# Patient Record
Sex: MALE | Race: WHITE | Employment: FULL TIME | URBAN - METROPOLITAN AREA
[De-identification: names, ages, dates, MRNs, and addresses within clinical notes are randomized per-mention and may not be internally consistent; named-entity substitution may affect disease eponyms.]

---

## 2017-01-23 ENCOUNTER — OFFICE VISIT (OUTPATIENT)
Dept: INTERNAL MEDICINE CLINIC | Age: 57
End: 2017-01-23

## 2017-01-23 VITALS
SYSTOLIC BLOOD PRESSURE: 103 MMHG | HEART RATE: 71 BPM | HEIGHT: 73 IN | BODY MASS INDEX: 31.86 KG/M2 | OXYGEN SATURATION: 96 % | DIASTOLIC BLOOD PRESSURE: 68 MMHG | TEMPERATURE: 97.4 F | WEIGHT: 240.4 LBS | RESPIRATION RATE: 18 BRPM

## 2017-01-23 DIAGNOSIS — B20 HUMAN IMMUNODEFICIENCY VIRUS (HIV) DISEASE (HCC): Primary | ICD-10-CM

## 2017-01-23 RX ORDER — METHYLPHENIDATE HYDROCHLORIDE 54 MG/1
54 TABLET ORAL DAILY
Qty: 30 TAB | Refills: 0 | Status: SHIPPED | OUTPATIENT
Start: 2017-01-23 | End: 2017-03-07 | Stop reason: SDUPTHER

## 2017-01-23 NOTE — PROGRESS NOTES
LLOYD Walters is a 64 y.o., male and is here today for follow up of HIV infection. The patient has been 100% compliant with HAART. Last CD4 count and Viral Load were 547 (23.8%) and less than 20 cps/ml on 9/26/16. Current HAART is comprised of daily boosted darunavir, rilpivirine, and dolutetgravir, which he is tolerating very well. His PCP is Dr. Desiree Walter who treats him for dyslipidemia, GERD, HTN, Vit D deficiency, DJD,and ADD. Allergy history, medication list, past medical history, and social history were all reviewed. No changes were made in any of these. Up to date on all immunizations. ROS Has lost about 6 lbs with diet. Feels generally very well. No fever, sweats, chills, nausea, vomiting, diarrhea, or rash. Weight has been stable abdomen obese status. . Level of energy is good. No changes in vision. No headaches or cognitive impairment. No medications have been added to the regimen since last visit. No signs of peripheral neuropathy. Review of systems otherwise negative with greater than 10 systems reviewed    Physical Exam  Vital signs and weight are stable. EOMI. Sclera anicteric. No thrush or leukoplakia. Lungs clear to A&P. Regular rhythm without murmur. Abdomen without organomegaly, mass, or tenderness. Bowel sounds normal. No joint abnormalities or edema. No lymphadenopathy. Neurologic exam is nonfocal.         ASSESSMENT and PLAN  #1: HIV infection. HIV is clinically and virologically stable. No change in HAART is planned at this time. CD4 count and viral load will be ordered today. Return in 4 months for follow up. The patient will check My Chart for lab results and call if there are any questions. #2: Obesity. Congratulated on weight loss and encouraged to continue diet. #3: Dyslipidemia. #4: Hypogonadism. #5. ADD. #6. Hypertension.  Well controlled

## 2017-01-23 NOTE — MR AVS SNAPSHOT
Visit Information Date & Time Provider Department Dept. Phone Encounter #  
 1/23/2017 11:30 AM Michael Glez, [address] [phone] [id number] Follow-up Instructions Return in about 4 months (around 5/23/2017), or if symptoms worsen or fail to improve. Your Appointments 1/23/2017 11:30 AM  
ROUTINE CARE with Michael Glez MD  
[facility] [address]) Appt Note: 4 month follow up  
 [address] [address]  
[phone]  
  
   
 [address]  [address]  
  
    
 2/20/2017  9:30 AM  
ROUTINE CARE with Iván Sheehan, [address] [place] [address] Road) Appt Note: 6 mon f/u  
 [address] [id number]  
[address]  [address] Upcoming Health Maintenance Date Due Pneumococcal 19-64 Highest Risk (2 of 3 - PPSV23) 3/26/2015 COLONOSCOPY 9/24/2021 DTaP/Tdap/Td series (2 - Td) 8/18/2026 Allergies as of 1/23/2017  Review Complete On: 1/23/2017 By: Michael Glez MD  
  
 Severity Noted Reaction Type Reactions Sustiva [Efavirenz]  11/15/2011    Vertigo  
 nightmares Current Immunizations  Reviewed on 1/23/2017 Name Date Influenza Vaccine 9/26/2016, 9/16/2014, 9/16/2013 Influenza Vaccine (Quad) PF 10/22/2015 Influenza Vaccine PF 9/17/2013 Pneumococcal Conjugate (PCV-13) 1/29/2015 Pneumococcal Conjugate (PCV-7) 6/17/2013 Tdap 8/18/2016 10:35 AM  
  
 Reviewed by Michael Glez MD on 1/23/2017 at 11:19 AM  
You Were Diagnosed With   
  
 Codes Comments Human immunodeficiency virus (HIV) disease ([facility]    -  Primary ICD-10-CM: B20 
ICD-9-CM: 934 Vitals BP Pulse Temp Resp Height(growth percentile) Weight(growth percentile)  103/68 (BP 1 Location: Left arm, BP Patient Position: Sitting) 71 97.4 °F (36.3 °C) (Oral) 18 6' 1\" (1.854 m) 240 lb 6.4 oz (109 kg) SpO2 BMI Smoking Status 96% 31.72 kg/m2 Former Smoker Vitals History BMI and BSA Data Body Mass Index Body Surface Area 31.72 kg/m 2 2.37 m 2 [facility] Phone [facility] [address] [phone] Your Updated Medication List  
  
   
This list is accurate as of: 1/23/17 11:23 AM.  Always use your most recent med list.  
  
  
  
  
 atorvastatin 20 mg tablet Commonly known as:  LIPITOR Take 1 Tab by mouth daily. diclofenac EC 50 mg EC tablet Commonly known as:  VOLTAREN  
TAKE ONE TABLET BY MOUTH TWICE A DAY  
  
 EDURANT 25 mg Tab tablet Generic drug:  rilpivirine TAKE ONE TABLET BY MOUTH DAILY  
  
 fenofibrate nanocrystallized 145 mg tablet Commonly known as:  TRICOR  
TAKE ONE TABLET BY MOUTH DAILY * hydroCHLOROthiazide 25 mg tablet Commonly known as:  HYDRODIURIL Take 1 Tab by mouth daily. * hydroCHLOROthiazide 25 mg tablet Commonly known as:  HYDRODIURIL  
TAKE ONE TABLET BY MOUTH DAILY  
  
 lisinopril 40 mg tablet Commonly known as:  PRINIVIL, ZESTRIL  
TAKE ONE TABLET BY MOUTH DAILY  
  
 methylphenidate 54 mg CR tablet Commonly known as:  CONCERTA Take 1 Tab (54 mg total) by mouth daily. May fill 1-23-17  
  
 metoprolol succinate 100 mg tablet Commonly known as:  TOPROL-XL  
TAKE ONE TABLET BY MOUTH DAILY MULTIVITAMIN PO Take  by mouth. * PREZISTA 600 mg tablet Generic drug:  darunavir TAKE ONE TABLET BY MOUTH TWICE A DAY  
  
 * PREZISTA 800 mg tablet Generic drug:  darunavir TAKE ONE TABLET BY MOUTH DAILY. TAKE WITH NORVIR  
  
 raNITIdine 300 mg tablet Commonly known as:  ZANTAC Take 1 Tab by mouth daily. ritonavir 100 mg capsule Commonly known as:  Mary Lasso Take 1 Cap by mouth two (2) times a day. sildenafil citrate 100 mg tablet Commonly known as:  VIAGRA Take 1 Tab by mouth as needed. testosterone 1.62 % (20.25 mg/1.25gram) gel Commonly known as:  ANDROGEL Apply 40.5 mg to affected area daily. Max Daily Amount: 40.5 mg.  
  
 TIVICAY 50 mg Tab tablet Generic drug:  dolutegravir TAKE ONE TABLET BY MOUTH DAILY  
  
 VITAMIN D3 1,000 unit Cap Generic drug:  cholecalciferol Take  by mouth daily. * Notice: This list has 4 medication(s) that are the same as other medications prescribed for you. Read the directions carefully, and ask your doctor or other care provider to review them with you. We Performed the Following HIV-1 RNA QT BY PCR [95639 CPT(R)] LYMPHOCYTES, CD4 PERCENT AND ABSOLUTE [77066 CPT(R)] METABOLIC PANEL, COMPREHENSIVE [15854 CPT(R)] Follow-up Instructions Return in about 4 months (around 5/23/2017), or if symptoms worsen or fail to improve. Introducing hospitals & HEALTH SERVICES! Dear Ramiro Sy: Thank you for requesting a [employer] account. Our records indicate that you already have an active Spyra account. You can access your account anytime at [email or web address] Did you know that you can access your hospital and ER discharge instructions at any time in Spyra? You can also review all of your test results from your hospital stay or ER visit. Additional Information If you have questions, please visit the Frequently Asked Questions section of the Spyra website at [email or web address] Remember, Spyra is NOT to be used for urgent needs. For medical emergencies, dial 911. Now available from your iPhone and Android! Please provide this summary of care documentation to your next provider. Your primary care clinician is listed as Stefan ARGUETA. If you have any questions after today's visit, please call [phone].

## 2017-01-25 LAB
ALBUMIN SERPL-MCNC: 4.5 G/DL (ref 3.5–5.5)
ALBUMIN/GLOB SERPL: 1.6 {RATIO} (ref 1.1–2.5)
ALP SERPL-CCNC: 37 IU/L (ref 39–117)
ALT SERPL-CCNC: 36 IU/L (ref 0–44)
AST SERPL-CCNC: 30 IU/L (ref 0–40)
BASOPHILS # BLD AUTO: 0 X10E3/UL (ref 0–0.2)
BASOPHILS NFR BLD AUTO: 0 %
BILIRUB SERPL-MCNC: 0.4 MG/DL (ref 0–1.2)
BUN SERPL-MCNC: 25 MG/DL (ref 6–24)
BUN/CREAT SERPL: 21 (ref 9–20)
CALCIUM SERPL-MCNC: 9.7 MG/DL (ref 8.7–10.2)
CD3+CD4+ CELLS # BLD: 730 /UL (ref 359–1519)
CD3+CD4+ CELLS NFR BLD: 22.8 % (ref 30.8–58.5)
CHLORIDE SERPL-SCNC: 100 MMOL/L (ref 96–106)
CO2 SERPL-SCNC: 25 MMOL/L (ref 18–29)
CREAT SERPL-MCNC: 1.21 MG/DL (ref 0.76–1.27)
EOSINOPHIL # BLD AUTO: 0.3 X10E3/UL (ref 0–0.4)
EOSINOPHIL NFR BLD AUTO: 3 %
ERYTHROCYTE [DISTWIDTH] IN BLOOD BY AUTOMATED COUNT: 13.7 % (ref 12.3–15.4)
GLOBULIN SER CALC-MCNC: 2.9 G/DL (ref 1.5–4.5)
GLUCOSE SERPL-MCNC: 93 MG/DL (ref 65–99)
HCT VFR BLD AUTO: 37.8 % (ref 37.5–51)
HGB BLD-MCNC: 12.9 G/DL (ref 12.6–17.7)
HIV1 RNA # SERPL NAA+PROBE: <20 COPIES/ML
HIV1 RNA SERPL NAA+PROBE-LOG#: NORMAL LOG10COPY/ML
IMM GRANULOCYTES # BLD: 0 X10E3/UL (ref 0–0.1)
IMM GRANULOCYTES NFR BLD: 0 %
LYMPHOCYTES # BLD AUTO: 3.2 X10E3/UL (ref 0.7–3.1)
LYMPHOCYTES NFR BLD AUTO: 35 %
MCH RBC QN AUTO: 29.9 PG (ref 26.6–33)
MCHC RBC AUTO-ENTMCNC: 34.1 G/DL (ref 31.5–35.7)
MCV RBC AUTO: 88 FL (ref 79–97)
MONOCYTES # BLD AUTO: 1 X10E3/UL (ref 0.1–0.9)
MONOCYTES NFR BLD AUTO: 11 %
NEUTROPHILS # BLD AUTO: 4.5 X10E3/UL (ref 1.4–7)
NEUTROPHILS NFR BLD AUTO: 51 %
PLATELET # BLD AUTO: 274 X10E3/UL (ref 150–379)
POTASSIUM SERPL-SCNC: 4.7 MMOL/L (ref 3.5–5.2)
PROT SERPL-MCNC: 7.4 G/DL (ref 6–8.5)
RBC # BLD AUTO: 4.31 X10E6/UL (ref 4.14–5.8)
SODIUM SERPL-SCNC: 140 MMOL/L (ref 134–144)
WBC # BLD AUTO: 9 X10E3/UL (ref 3.4–10.8)

## 2017-03-07 ENCOUNTER — OFFICE VISIT (OUTPATIENT)
Dept: INTERNAL MEDICINE CLINIC | Age: 57
End: 2017-03-07

## 2017-03-07 VITALS
SYSTOLIC BLOOD PRESSURE: 114 MMHG | HEIGHT: 73 IN | WEIGHT: 239 LBS | OXYGEN SATURATION: 96 % | DIASTOLIC BLOOD PRESSURE: 70 MMHG | HEART RATE: 72 BPM | TEMPERATURE: 97.8 F | BODY MASS INDEX: 31.68 KG/M2

## 2017-03-07 DIAGNOSIS — I10 ESSENTIAL HYPERTENSION: Primary | ICD-10-CM

## 2017-03-07 DIAGNOSIS — E78.5 DYSLIPIDEMIA: ICD-10-CM

## 2017-03-07 DIAGNOSIS — F98.8 ADD (ATTENTION DEFICIT DISORDER): ICD-10-CM

## 2017-03-07 DIAGNOSIS — R79.89 LOW TESTOSTERONE LEVEL IN MALE: ICD-10-CM

## 2017-03-07 RX ORDER — METHYLPHENIDATE HYDROCHLORIDE 54 MG/1
54 TABLET ORAL DAILY
Qty: 30 TAB | Refills: 0 | Status: SHIPPED | OUTPATIENT
Start: 2017-03-07 | End: 2017-05-22 | Stop reason: SDUPTHER

## 2017-03-07 RX ORDER — DICLOFENAC SODIUM 50 MG/1
TABLET, DELAYED RELEASE ORAL
Qty: 30 TAB | Refills: 5 | Status: SHIPPED | OUTPATIENT
Start: 2017-03-07 | End: 2017-11-01 | Stop reason: ALTCHOICE

## 2017-03-07 NOTE — MR AVS SNAPSHOT
Visit Information Date & Time Provider Department Dept. Phone Encounter #  
 3/7/2017  1:30 PM Kinjal Pineda, [address] [place] [address],[address] [phone] [id number] Follow-up Instructions Return in about 6 months (around 9/7/2017) for htn hld, psa ADD. Upcoming Health Maintenance Date Due Pneumococcal 19-64 Highest Risk (2 of 3 - PPSV23) 3/26/2015 COLONOSCOPY 9/24/2021 DTaP/Tdap/Td series (2 - Td) 8/18/2026 Allergies as of 3/7/2017  Review Complete On: 3/7/2017 By: Lucas Ann LPN Severity Noted Reaction Type Reactions Sustiva [Efavirenz]  11/15/2011    Vertigo  
 nightmares Current Immunizations  Reviewed on 1/23/2017 Name Date Influenza Vaccine 9/26/2016, 9/16/2014, 9/16/2013 Influenza Vaccine (Quad) PF 10/22/2015 Influenza Vaccine PF 9/17/2013 Pneumococcal Conjugate (PCV-13) 1/29/2015 Pneumococcal Conjugate (PCV-7) 6/17/2013 Tdap 8/18/2016 10:35 AM  
  
 Not reviewed this visit You Were Diagnosed With   
  
 Codes Comments Essential hypertension    -  Primary ICD-10-CM: I10 
ICD-9-CM: 401.9 ADD (attention deficit disorder)     ICD-10-CM: F98.8 ICD-9-CM: 314.00 Dyslipidemia     ICD-10-CM: E78.5 ICD-9-CM: 272.4 Low testosterone level in male     ICD-10-CM: E29.1 ICD-9-CM: 257.2 Vitals BP Pulse Temp Height(growth percentile) Weight(growth percentile) SpO2  
 114/70 (BP 1 Location: Left arm, BP Patient Position: Sitting) 72 97.8 °F (36.6 °C) (Oral) 6' 1\" (1.854 m) 239 lb (108.4 kg) 96% BMI Smoking Status 31.53 kg/m2 Former Smoker BMI and BSA Data Body Mass Index Body Surface Area  
 31.53 kg/m 2 2.36 m 2 Preferred Pharmacy Pharmacy Name Phone Carlitos Mejias [address] [facility] [phone] Your Updated Medication List  
  
   
This list is accurate as of: 3/7/17  2:00 PM.  Always use your most recent med list.  
  
  
  
  
 atorvastatin 20 mg tablet Commonly known as:  LIPITOR Take 1 Tab by mouth daily. diclofenac EC 50 mg EC tablet Commonly known as:  VOLTAREN One bid prn pain EDURANT 25 mg Tab tablet Generic drug:  rilpivirine TAKE ONE TABLET BY MOUTH DAILY  
  
 fenofibrate nanocrystallized 145 mg tablet Commonly known as:  TRICOR  
TAKE ONE TABLET BY MOUTH DAILY  
  
 hydroCHLOROthiazide 25 mg tablet Commonly known as:  HYDRODIURIL Take 1 Tab by mouth daily. lisinopril 40 mg tablet Commonly known as:  PRINIVIL, ZESTRIL  
TAKE ONE TABLET BY MOUTH DAILY  
  
 methylphenidate 54 mg CR tablet Commonly known as:  CONCERTA Take 1 Tab (54 mg total) by mouth daily. May fill 3-23-17  
  
 metoprolol succinate 100 mg tablet Commonly known as:  TOPROL-XL  
TAKE ONE TABLET BY MOUTH DAILY MULTIVITAMIN PO Take  by mouth. PREZISTA 600 mg tablet Generic drug:  darunavir TAKE ONE TABLET BY MOUTH TWICE A DAY  
  
 raNITIdine 300 mg tablet Commonly known as:  ZANTAC Take 1 Tab by mouth daily. ritonavir 100 mg capsule Commonly known as:  Avelina Poll Take 1 Cap by mouth two (2) times a day. sildenafil citrate 100 mg tablet Commonly known as:  VIAGRA Take 1 Tab by mouth as needed. testosterone 20.25 mg/1.25 gram (1.62 %) gel Commonly known as:  ANDROGEL Apply 40.5 mg to affected area daily. Max Daily Amount: 40.5 mg.  
  
 TIVICAY 50 mg Tab tablet Generic drug:  dolutegravir TAKE ONE TABLET BY MOUTH DAILY  
  
 VITAMIN D3 1,000 unit Cap Generic drug:  cholecalciferol Take  by mouth daily. Prescriptions Printed Refills  
 methylphenidate ER 54 mg 24 hr tab 0 Sig: Take 1 Tab (54 mg total) by mouth daily. May fill 3-23-17 Class: Print Route: Oral  
  
Prescriptions Sent to Pharmacy Refills  
 diclofenac EC (VOLTAREN) 50 mg EC tablet 5  Sig: One bid prn pain  
 Class: Normal  
 [facility] [address] [facility] [phone] We Performed the Following TESTOSTERONE, TOTAL, ADULT MALE [31613 CPT(R)] Follow-up Instructions Return in about 6 months (around 9/7/2017) for htn hld, psa ADD. Introducing [facility] & HEALTH SERVICES! Dear Liu Riley: Thank you for requesting a [employer] account. Our records indicate that you already have an active [employer] account. You can access your account anytime at [email or web address] Did you know that you can access your hospital and ER discharge instructions at any time in [employer]? You can also review all of your test results from your hospital stay or ER visit. Additional Information If you have questions, please visit the Frequently Asked Questions section of the [employer] website at [email or web address] Remember, [employer] is NOT to be used for urgent needs. For medical emergencies, dial 911. Now available from your iPhone and Android! Please provide this summary of care documentation to your next provider. Your primary care clinician is listed as Gemma ARGUETA. If you have any questions after today's visit, please call [phone].

## 2017-03-07 NOTE — PROGRESS NOTES
HISTORY OF PRESENT ILLNESS  Heidi Avery is a 64 y.o. male. HPI     F/u HTN dyslipidemia, ADD GERD, low testosterone  Training for a triathalon  Having increased sciatic on right side, keeps him up at night x 2 weeks. Diclofenac helps and wants a refill  Overall the sciatica sxs are better since training,swimming  Pt hopes to get off high blood pressure medicines at some point    Patient Active Problem List    Diagnosis Date Noted    LFT elevation 01/30/2015    HTN (hypertension) 09/17/2013    Low serum testosterone level 09/17/2013    Dyslipidemia 09/17/2013    Human immunodeficiency virus (HIV) disease ([facility] 01/25/2013    ADD (attention deficit disorder) 01/25/2013     Current Outpatient Prescriptions   Medication Sig Dispense Refill    diclofenac EC (VOLTAREN) 50 mg EC tablet One bid prn pain 30 Tab 5    methylphenidate ER 54 mg 24 hr tab Take 1 Tab (54 mg total) by mouth daily. May fill 3-23-17 30 Tab 0    metoprolol succinate (TOPROL-XL) 100 mg tablet TAKE ONE TABLET BY MOUTH DAILY 30 Tab 9    atorvastatin (LIPITOR) 20 mg tablet Take 1 Tab by mouth daily. 30 Tab 11    cholecalciferol (VITAMIN D3) 1,000 unit cap Take  by mouth daily.  testosterone (ANDROGEL) 20.25 mg/1.25 gram (1.62 %) gel Apply 40.5 mg to affected area daily. Max Daily Amount: 40.5 mg. 1 Bottle 5    lisinopril (PRINIVIL, ZESTRIL) 40 mg tablet TAKE ONE TABLET BY MOUTH DAILY 30 Tab 11    TIVICAY 50 mg tab tablet TAKE ONE TABLET BY MOUTH DAILY 90 Tab 4    EDURANT 25 mg tab tablet TAKE ONE TABLET BY MOUTH DAILY 90 Tab 4    fenofibrate nanocrystallized (TRICOR) 145 mg tablet TAKE ONE TABLET BY MOUTH DAILY 30 Tab 11    hydrochlorothiazide (HYDRODIURIL) 25 mg tablet Take 1 Tab by mouth daily. 30 Tab 6    PREZISTA 600 mg tablet TAKE ONE TABLET BY MOUTH TWICE A  Tab 3    ritonavir (NORVIR) 100 mg capsule Take 1 Cap by mouth two (2) times a day. 180 Cap 5    MULTIVITAMIN PO Take  by mouth.         ranitidine (ZANTAC) 300 mg tablet Take 1 Tab by mouth daily. 30 Tab 11    sildenafil citrate (VIAGRA) 100 mg tablet Take 1 Tab by mouth as needed. 3 Tab 11     Allergies   Allergen Reactions    Sustiva [Efavirenz] Vertigo     nightmares      Lab Results  Component Value Date/Time   Glucose 93 01/23/2017 11:28 AM   LDL, calculated 131 08/18/2016 10:50 AM   Creatinine 1.21 01/23/2017 11:28 AM      Lab Results  Component Value Date/Time   Cholesterol, total 233 08/18/2016 10:50 AM   HDL Cholesterol 34 08/18/2016 10:50 AM   LDL, calculated 131 08/18/2016 10:50 AM   Triglyceride 338 08/18/2016 10:50 AM   CHOL/HDL Ratio 6.6 01/06/2010 11:47 AM       Lab Results  Component Value Date/Time   GFR est AA 77 01/23/2017 11:28 AM   GFR est non-AA 67 01/23/2017 11:28 AM   Creatinine 1.21 01/23/2017 11:28 AM   BUN 25 01/23/2017 11:28 AM   Sodium 140 01/23/2017 11:28 AM   Potassium 4.7 01/23/2017 11:28 AM   Chloride 100 01/23/2017 11:28 AM   CO2 25 01/23/2017 11:28 AM         ROS    Physical Exam   Constitutional: He appears well-developed and well-nourished. No distress. Appears stated age   HENT:   Head: Normocephalic. Cardiovascular: Normal rate, regular rhythm and normal heart sounds. Pulmonary/Chest: Effort normal and breath sounds normal.   Abdominal: Soft. Musculoskeletal: He exhibits no edema. Neurological: He is alert. Psychiatric: He has a normal mood and affect. Nursing note and vitals reviewed.       ASSESSMENT and PLAN  Ramiro Sy was seen today for physical.    Diagnoses and all orders for this visit:    Essential hypertension   Good control on medicines    ADD (attention deficit disorder)   refill ritalin 1/2 tab every day    reviewed    Dyslipidemia   Continue diet and exercise program    Low testosterone level in male  -     TESTOSTERONE, TOTAL, ADULT MALE   On 2 pumps per day -androgel   Has not noticed much improvement in energy but levels have been low    Sciatica   Refilled voltaren  Other orders  -     diclofenac EC (VOLTAREN) 50 mg EC tablet; One bid prn pain  -     methylphenidate ER 54 mg 24 hr tab; Take 1 Tab (54 mg total) by mouth daily.   May fill 3-23-17      Follow-up Disposition: Not on File

## 2017-05-10 ENCOUNTER — TELEPHONE (OUTPATIENT)
Dept: INTERNAL MEDICINE CLINIC | Age: 57
End: 2017-05-10

## 2017-05-10 NOTE — TELEPHONE ENCOUNTER
Dr. Rashmi Allen patient states he needs a call back to be advised what to do about Extreme Diarrhea that patient states has become so bad  He has soiled himself & thinks this could be a stomach virus & needs to be advised what to do so as not to become dehydrated. Please call to advise.  Thank you

## 2017-05-10 NOTE — TELEPHONE ENCOUNTER
Call completed to patient, two identifiers verified. Patient reports 3 loose bowel movements that were \"so severe, I spoiled my pants\" Patient advised to drink plenty of fluids and follow the brat diet. Patient reports taking an over the counter anti-diarrheal medication  with no relief. Please advise.

## 2017-05-11 NOTE — TELEPHONE ENCOUNTER
Call completed to patient, two identifiers verified. Patient advised per Dr. Curtis Shannon to Advise plenty of fluids, immodium ad prn- notify if fever, blood or ongoing diarrhea for > 2 days. Patient verbalized an understanding.

## 2017-05-15 ENCOUNTER — OFFICE VISIT (OUTPATIENT)
Dept: INTERNAL MEDICINE CLINIC | Age: 57
End: 2017-05-15

## 2017-05-15 VITALS
OXYGEN SATURATION: 99 % | SYSTOLIC BLOOD PRESSURE: 109 MMHG | BODY MASS INDEX: 30.09 KG/M2 | HEART RATE: 85 BPM | TEMPERATURE: 98 F | RESPIRATION RATE: 18 BRPM | HEIGHT: 73 IN | DIASTOLIC BLOOD PRESSURE: 70 MMHG | WEIGHT: 227 LBS

## 2017-05-15 DIAGNOSIS — R19.7 DIARRHEA OF PRESUMED INFECTIOUS ORIGIN: Primary | ICD-10-CM

## 2017-05-15 RX ORDER — DARUNAVIR 800 MG/1
TABLET, FILM COATED ORAL
COMMUNITY
Start: 2017-02-15 | End: 2017-08-14 | Stop reason: SDUPTHER

## 2017-05-15 NOTE — PATIENT INSTRUCTIONS
Diarrhea: Care Instructions  Your Care Instructions    Diarrhea is loose, watery stools (bowel movements). The exact cause is often hard to find. Sometimes diarrhea is your body's way of getting rid of what caused an upset stomach. Viruses, food poisoning, and many medicines can cause diarrhea. Some people get diarrhea in response to emotional stress, anxiety, or certain foods. Almost everyone has diarrhea now and then. It usually isn't serious, and your stools will return to normal soon. The important thing to do is replace the fluids you have lost, so you can prevent dehydration. The doctor has checked you carefully, but problems can develop later. If you notice any problems or new symptoms, get medical treatment right away. Follow-up care is a key part of your treatment and safety. Be sure to make and go to all appointments, and call your doctor if you are having problems. It's also a good idea to know your test results and keep a list of the medicines you take. How can you care for yourself at home? · Watch for signs of dehydration, which means your body has lost too much water. Dehydration is a serious condition and should be treated right away. Signs of dehydration are:  ¨ Increasing thirst and dry eyes and mouth. ¨ Feeling faint or lightheaded. ¨ Darker urine, and a smaller amount of urine than normal.  · To prevent dehydration, drink plenty of fluids, enough so that your urine is light yellow or clear like water. Choose water and other caffeine-free clear liquids until you feel better. If you have kidney, heart, or liver disease and have to limit fluids, talk with your doctor before you increase the amount of fluids you drink. · Begin eating small amounts of mild foods the next day, if you feel like it. ¨ Try yogurt that has live cultures of Lactobacillus. (Check the label.)  ¨ Avoid spicy foods, fruits, alcohol, and caffeine until 48 hours after all symptoms are gone.   ¨ Avoid chewing gum that contains sorbitol. ¨ Avoid dairy products (except for yogurt with Lactobacillus) while you have diarrhea and for 3 days after symptoms are gone. · The doctor may recommend that you take over-the-counter medicine, such as loperamide (Imodium), if you still have diarrhea after 6 hours. Read and follow all instructions on the label. Do not use this medicine if you have bloody diarrhea, a high fever, or other signs of serious illness. Call your doctor if you think you are having a problem with your medicine. When should you call for help? Call 911 anytime you think you may need emergency care. For example, call if:  · You passed out (lost consciousness). · Your stools are maroon or very bloody. Call your doctor now or seek immediate medical care if:  · You are dizzy or lightheaded, or you feel like you may faint. · Your stools are black and look like tar, or they have streaks of blood. · You have new or worse belly pain. · You have symptoms of dehydration, such as:  ¨ Dry eyes and a dry mouth. ¨ Passing only a little dark urine. ¨ Feeling thirstier than usual.  · You have a new or higher fever. Watch closely for changes in your health, and be sure to contact your doctor if:  · Your diarrhea is getting worse. · You see pus in the diarrhea. · You are not getting better after 2 days (48 hours). Where can you learn more? Go to http://carlos-regla.info/. Enter N862 in the search box to learn more about \"Diarrhea: Care Instructions. \"  Current as of: May 27, 2016  Content Version: 11.2  © 7109-3926 [employer]. Care instructions adapted under license by [employer] (which disclaims liability or warranty for this information).  If you have questions about a medical condition or this instruction, always ask your healthcare professional. [place] [address] any warranty or liability for your use of this information.  ----------------------------------  Bring in stool sample as soon as possible  Apply Desitin to protect around areas of bowel movements to prevent rash   Blood work today

## 2017-05-15 NOTE — PROGRESS NOTES
Chief Complaint   Patient presents with    Diarrhea     x1 week     1. Have you been to the ER, urgent care clinic since your last visit? Hospitalized since your last visit? No    2. Have you seen or consulted any other health care providers outside of the [address] [place] [address] since your last visit? Include any pap smears or colon screening.  No

## 2017-05-15 NOTE — PROGRESS NOTES
CC: Diarrhea (x1 week)      HPI:    He is a 64 y.o. male with a hx of HIV on antiviral medsw presents for evaluation of diarrhea    Symptoms onset 6 days ago. Stools are watery light brown, Patient called during weekend and started on ALICIA diet and took imodium with some relief   Lost 10 lbs. Had chills  Visited mother in law in the hospital 9 days ago and she had \"Diarrhea\" patient unsure of diagnosis. No recent antibiotics  No eating out  No recent med changes  Has had fissures in the past and has noted mild blood when wiping no blood in the stool. Had a colonoscopy at ~50 and diverticulosis per notes - I could not locate report   Has 2 dogs    ROS:  Constitutional: negative for fevers, chills, anorexia and weight loss  12 systems reviewed and negative other than  HPI         Past Medical History:   Diagnosis Date    ADD (attention deficit disorder)     HIV positive ([facility] [address] 1985       Current Outpatient Prescriptions on File Prior to Visit   Medication Sig Dispense Refill    diclofenac EC (VOLTAREN) 50 mg EC tablet One bid prn pain 30 Tab 5    methylphenidate ER 54 mg 24 hr tab Take 1 Tab (54 mg total) by mouth daily. May fill 3-23-17 30 Tab 0    metoprolol succinate (TOPROL-XL) 100 mg tablet TAKE ONE TABLET BY MOUTH DAILY 30 Tab 9    atorvastatin (LIPITOR) 20 mg tablet Take 1 Tab by mouth daily. 30 Tab 11    cholecalciferol (VITAMIN D3) 1,000 unit cap Take  by mouth daily.  ranitidine (ZANTAC) 300 mg tablet Take 1 Tab by mouth daily. 30 Tab 11    testosterone (ANDROGEL) 20.25 mg/1.25 gram (1.62 %) gel Apply 40.5 mg to affected area daily.  Max Daily Amount: 40.5 mg. 1 Bottle 5    lisinopril (PRINIVIL, ZESTRIL) 40 mg tablet TAKE ONE TABLET BY MOUTH DAILY 30 Tab 11    TIVICAY 50 mg tab tablet TAKE ONE TABLET BY MOUTH DAILY 90 Tab 4    EDURANT 25 mg tab tablet TAKE ONE TABLET BY MOUTH DAILY 90 Tab 4    fenofibrate nanocrystallized (TRICOR) 145 mg tablet TAKE ONE TABLET BY MOUTH DAILY 30 Tab 11  sildenafil citrate (VIAGRA) 100 mg tablet Take 1 Tab by mouth as needed. 3 Tab 11    hydrochlorothiazide (HYDRODIURIL) 25 mg tablet Take 1 Tab by mouth daily. 30 Tab 6    PREZISTA 600 mg tablet TAKE ONE TABLET BY MOUTH TWICE A  Tab 3    ritonavir (NORVIR) 100 mg capsule Take 1 Cap by mouth two (2) times a day. 180 Cap 5    MULTIVITAMIN PO Take  by mouth. No current facility-administered medications on file prior to visit. History reviewed. No pertinent surgical history. Family History   Problem Relation Age of Onset    Hypertension Mother     Hypertension Sister     Hypertension Brother      Reviewed and no changes     Social History     Social History    Marital status: LIFE PARTNER     Spouse name: N/A    Number of children: N/A    Years of education: N/A     Occupational History    Not on file.      Social History Main Topics    Smoking status: Former Smoker    Smokeless tobacco: Never Used    Alcohol use 0.0 - 0.5 oz/week     0 - 1 Standard drinks or equivalent per week    Drug use: Yes     Special: Prescription, OTC    Sexual activity: Yes     Partners: Male     Other Topics Concern    Not on file     Social History Narrative            Visit Vitals    /70 (BP 1 Location: Right arm, BP Patient Position: Sitting)    Pulse 85    Temp 98 °F (36.7 °C) (Oral)    Resp 18    Ht 6' 1\" (1.854 m)    Wt 227 lb (103 kg)    SpO2 99%    BMI 29.95 kg/m2       Physical Examination:   General - Well appearing male  HEENT - PERRL, TM no erythema/opacification, normal nasal turbinates, oropharynx no erythema or exudate, MMM  Neck - supple, no bruits, no TMG, no LAD  Pulm - clear to auscultation bilaterally  Cardio - RRR, normal S1 S2, no murmur gallops or rubs  Abd - soft, nontender, no masses, no HSM  Extrem - no edema, +2 distal pulses  Psych - normal affect, appropriate mood    Lab Results   Component Value Date/Time    WBC 9.0 01/23/2017 11:28 AM    HGB 12.9 01/23/2017 11:28 AM    HCT 37.8 01/23/2017 11:28 AM    PLATELET 170 00/79/7777 11:28 AM    MCV 88 01/23/2017 11:28 AM     Lab Results   Component Value Date/Time    Sodium 140 01/23/2017 11:28 AM    Potassium 4.7 01/23/2017 11:28 AM    Chloride 100 01/23/2017 11:28 AM    CO2 25 01/23/2017 11:28 AM    Anion gap 7 01/06/2010 11:47 AM    Glucose 93 01/23/2017 11:28 AM    BUN 25 01/23/2017 11:28 AM    Creatinine 1.21 01/23/2017 11:28 AM    BUN/Creatinine ratio 21 01/23/2017 11:28 AM    GFR est AA 77 01/23/2017 11:28 AM    GFR est non-AA 67 01/23/2017 11:28 AM    Calcium 9.7 01/23/2017 11:28 AM     Lab Results   Component Value Date/Time    Cholesterol, total 233 08/18/2016 10:50 AM    HDL Cholesterol 34 08/18/2016 10:50 AM    LDL, calculated 131 08/18/2016 10:50 AM    VLDL, calculated 68 08/18/2016 10:50 AM    Triglyceride 338 08/18/2016 10:50 AM    CHOL/HDL Ratio 6.6 01/06/2010 11:47 AM     Lab Results   Component Value Date/Time    TSH 3.080 01/22/2015 07:28 AM     Lab Results   Component Value Date/Time    Prostate Specific Ag 0.5 08/18/2016 10:50 AM    Prostate Specific Ag 0.5 07/07/2015 08:40 AM    Prostate Specific Ag 0.6 01/22/2015 07:28 AM     No results found for: HBA1C, HGBE8, BEP8ZJED, LXT1OZAN, CAR7YMRS  No results found for: Durel Susan, VD3RIA    Lab Results   Component Value Date/Time    ALT (SGPT) 36 01/23/2017 11:28 AM    AST (SGOT) 30 01/23/2017 11:28 AM    Alk. phosphatase 37 01/23/2017 11:28 AM    Bilirubin, total 0.4 01/23/2017 11:28 AM           Assessment/Plan:    1. Diarrhea of presumed infectious origin: present for 1 week with weight loss. Normal exam. No evidence of dehydration.  Patient with HIV but well controlled on antiviral. Only risk factor identified is recent visit to the hospital mother in law with diarrhea prior to onset of symptoms.   - METABOLIC PANEL, COMPREHENSIVE  - GASTROINTESTINAL PROFILE, STOOL, PCR - which will check for several infectious causes including C dif, norovirus, and parasite causes. ..  - CBC WITH AUTOMATED DIFF  - work note given  - advised to go to ER if symptoms worsen    Will call patient with stool lab results     Follow-up Disposition:  Return if symptoms worsen or fail to improve.     MD Catie

## 2017-05-15 NOTE — MR AVS SNAPSHOT
Visit Information Date & Time Provider Department Dept. Phone Encounter #  
 5/15/2017  3:15 PM Deb Aguilar, [address] [phone] [id number] Follow-up Instructions Return if symptoms worsen or fail to improve. Your Appointments 9/7/2017  9:30 AM  
ROUTINE CARE with Elaine Barthel, [address] [facility]) Appt Note: 6 month follow up  
 [address] [facility] [phone]  
[address] [facility] [address] [place] [address] Upcoming Health Maintenance Date Due Pneumococcal 19-64 Highest Risk (2 of 3 - PPSV23) 3/26/2015 INFLUENZA AGE 9 TO ADULT 8/1/2017 COLONOSCOPY 9/24/2021 DTaP/Tdap/Td series (2 - Td) 8/18/2026 Allergies as of 5/15/2017  Review Complete On: 5/15/2017 By: Chris Ramirez Severity Noted Reaction Type Reactions Sustiva [Efavirenz]  11/15/2011    Vertigo  
 nightmares Current Immunizations  Reviewed on 1/23/2017 Name Date Influenza Vaccine 9/26/2016, 9/16/2014, 9/16/2013 Influenza Vaccine (Quad) PF 10/22/2015 Influenza Vaccine PF 9/17/2013 Pneumococcal Conjugate (PCV-13) 1/29/2015 Pneumococcal Conjugate (PCV-7) 6/17/2013 Tdap 8/18/2016 10:35 AM  
  
 Not reviewed this visit You Were Diagnosed With   
  
 Codes Comments Diarrhea of presumed infectious origin    -  Primary ICD-10-CM: A09 ICD-9-CM: 550. 3 Vitals BP Pulse Temp Resp Height(growth percentile) Weight(growth percentile) 109/70 (BP 1 Location: Right arm, BP Patient Position: Sitting) 85 98 °F (36.7 °C) (Oral) 18 6' 1\" (1.854 m) 227 lb (103 kg) SpO2 BMI Smoking Status 99% 29.95 kg/m2 Former Smoker BMI and BSA Data Body Mass Index Body Surface Area  
 29.95 kg/m 2 2.3 m 2 Preferred Pharmacy Pharmacy Name Phone  [facility] [phone], [address] AT [address] [facility] Your Updated Medication List  
  
   
This list is accurate as of: 5/15/17  3:45 PM.  Always use your most recent med list.  
  
  
  
  
 atorvastatin 20 mg tablet Commonly known as:  LIPITOR Take 1 Tab by mouth daily. diclofenac EC 50 mg EC tablet Commonly known as:  VOLTAREN One bid prn pain EDURANT 25 mg Tab tablet Generic drug:  rilpivirine TAKE ONE TABLET BY MOUTH DAILY  
  
 fenofibrate nanocrystallized 145 mg tablet Commonly known as:  TRICOR  
TAKE ONE TABLET BY MOUTH DAILY  
  
 hydroCHLOROthiazide 25 mg tablet Commonly known as:  HYDRODIURIL Take 1 Tab by mouth daily. lisinopril 40 mg tablet Commonly known as:  PRINIVIL, ZESTRIL  
TAKE ONE TABLET BY MOUTH DAILY  
  
 methylphenidate 54 mg CR tablet Commonly known as:  CONCERTA Take 1 Tab (54 mg total) by mouth daily. May fill 3-23-17  
  
 metoprolol succinate 100 mg tablet Commonly known as:  TOPROL-XL  
TAKE ONE TABLET BY MOUTH DAILY MULTIVITAMIN PO Take  by mouth. * PREZISTA 600 mg tablet Generic drug:  darunavir TAKE ONE TABLET BY MOUTH TWICE A DAY  
  
 * PREZISTA 800 mg tablet Generic drug:  darunavir  
  
 raNITIdine 300 mg tablet Commonly known as:  ZANTAC Take 1 Tab by mouth daily. ritonavir 100 mg capsule Commonly known as:  Bull Pressman Take 1 Cap by mouth two (2) times a day. sildenafil citrate 100 mg tablet Commonly known as:  VIAGRA Take 1 Tab by mouth as needed. testosterone 20.25 mg/1.25 gram (1.62 %) gel Commonly known as:  ANDROGEL Apply 40.5 mg to affected area daily. Max Daily Amount: 40.5 mg.  
  
 TIVICAY 50 mg Tab tablet Generic drug:  dolutegravir TAKE ONE TABLET BY MOUTH DAILY  
  
 VITAMIN D3 1,000 unit Cap Generic drug:  cholecalciferol Take  by mouth daily. * Notice:   This list has 2 medication(s) that are the same as other medications prescribed for you. Read the directions carefully, and ask your doctor or other care provider to review them with you. We Performed the Following CBC WITH AUTOMATED DIFF [38996 CPT(R)] GASTROINTESTINAL PROFILE, STOOL, PCR [id number] Custom] METABOLIC PANEL, COMPREHENSIVE [10945 CPT(R)] Follow-up Instructions Return if symptoms worsen or fail to improve. Patient Instructions Diarrhea: Care Instructions Your Care Instructions Diarrhea is loose, watery stools (bowel movements). The exact cause is often hard to find. Sometimes diarrhea is your body's way of getting rid of what caused an upset stomach. Viruses, food poisoning, and many medicines can cause diarrhea. Some people get diarrhea in response to emotional stress, anxiety, or certain foods. Almost everyone has diarrhea now and then. It usually isn't serious, and your stools will return to normal soon. The important thing to do is replace the fluids you have lost, so you can prevent dehydration. The doctor has checked you carefully, but problems can develop later. If you notice any problems or new symptoms, get medical treatment right away. Follow-up care is a key part of your treatment and safety. Be sure to make and go to all appointments, and call your doctor if you are having problems. It's also a good idea to know your test results and keep a list of the medicines you take. How can you care for yourself at home? · Watch for signs of dehydration, which means your body has lost too much water. Dehydration is a serious condition and should be treated right away. Signs of dehydration are: 
¨ Increasing thirst and dry eyes and mouth. ¨ Feeling faint or lightheaded. ¨ Darker urine, and a smaller amount of urine than normal. 
· To prevent dehydration, drink plenty of fluids, enough so that your urine is light yellow or clear like water.  Choose water and other caffeine-free clear liquids until you feel better. If you have kidney, heart, or liver disease and have to limit fluids, talk with your doctor before you increase the amount of fluids you drink. · Begin eating small amounts of mild foods the next day, if you feel like it. ¨ Try yogurt that has live cultures of Lactobacillus. (Check the label.) ¨ Avoid spicy foods, fruits, alcohol, and caffeine until 48 hours after all symptoms are gone. ¨ Avoid chewing gum that contains sorbitol. ¨ Avoid dairy products (except for yogurt with Lactobacillus) while you have diarrhea and for 3 days after symptoms are gone. · The doctor may recommend that you take over-the-counter medicine, such as loperamide (Imodium), if you still have diarrhea after 6 hours. Read and follow all instructions on the label. Do not use this medicine if you have bloody diarrhea, a high fever, or other signs of serious illness. Call your doctor if you think you are having a problem with your medicine. When should you call for help? Call 911 anytime you think you may need emergency care. For example, call if: 
· You passed out (lost consciousness). · Your stools are maroon or very bloody. Call your doctor now or seek immediate medical care if: 
· You are dizzy or lightheaded, or you feel like you may faint. · Your stools are black and look like tar, or they have streaks of blood. · You have new or worse belly pain. · You have symptoms of dehydration, such as: ¨ Dry eyes and a dry mouth. ¨ Passing only a little dark urine. ¨ Feeling thirstier than usual. 
· You have a new or higher fever. Watch closely for changes in your health, and be sure to contact your doctor if: 
· Your diarrhea is getting worse. · You see pus in the diarrhea. · You are not getting better after 2 days (48 hours). Where can you learn more? Go to http://carlos-regla.info/.  
Enter N733 in the search box to learn more about \"Diarrhea: Care Instructions. \" Current as of: May 27, 2016 Content Version: 11.2 © 5333-7772 [employer]. Care instructions adapted under license by [employer] (which disclaims liability or warranty for this information). If you have questions about a medical condition or this instruction, always ask your healthcare professional. [address] any warranty or liability for your use of this information. 
---------------------------------- Bring in stool sample as soon as possible Apply Desitin to protect around areas of bowel movements to prevent rash Blood work today Introducing [facility] SERVICES! Dear Julissa Marie: Thank you for requesting a [employer] account. Our records indicate that you already have an active [employer] account. You can access your account anytime at [email or web address] [place]. [employer] [place] Did you know that you can access your hospital and ER discharge instructions at any time in [employer]? You can also review all of your test results from your hospital stay or ER visit. Additional Information If you have questions, please visit the Frequently Asked Questions section of the [employer] website at [email or web address] [place]. [employer] [place]/. Remember, [employer] is NOT to be used for urgent needs. For medical emergencies, dial 911. Now available from your iPhone and Android! Please provide this summary of care documentation to your next provider. Your primary care clinician is listed as Nicolasa ARGUETA. If you have any questions after today's visit, please call [phone].

## 2017-05-15 NOTE — LETTER
NOTIFICATION RETURN TO WORK / SCHOOL 
 
5/15/2017 3:33 PM 
 
Mr. Rosy Gao [address] To Whom It May Concern: 
 
Rosy Gao is currently under the care of [facility]. He will return to work/school on: until May 19th If there are questions or concerns please have the patient contact our office. Sincerely, Ovi Daniel MD

## 2017-05-16 LAB
ALBUMIN SERPL-MCNC: 4.4 G/DL (ref 3.5–5.5)
ALBUMIN/GLOB SERPL: 1.4 {RATIO} (ref 1.2–2.2)
ALP SERPL-CCNC: 37 IU/L (ref 39–117)
ALT SERPL-CCNC: 35 IU/L (ref 0–44)
AST SERPL-CCNC: 34 IU/L (ref 0–40)
BASOPHILS # BLD AUTO: 0.2 X10E3/UL (ref 0–0.2)
BASOPHILS NFR BLD AUTO: 2 %
BILIRUB SERPL-MCNC: 0.4 MG/DL (ref 0–1.2)
BUN SERPL-MCNC: 28 MG/DL (ref 6–24)
BUN/CREAT SERPL: 22 (ref 9–20)
CALCIUM SERPL-MCNC: 9.8 MG/DL (ref 8.7–10.2)
CHLORIDE SERPL-SCNC: 98 MMOL/L (ref 96–106)
CO2 SERPL-SCNC: 24 MMOL/L (ref 18–29)
CREAT SERPL-MCNC: 1.27 MG/DL (ref 0.76–1.27)
EOSINOPHIL # BLD AUTO: 0.3 X10E3/UL (ref 0–0.4)
EOSINOPHIL NFR BLD AUTO: 3 %
ERYTHROCYTE [DISTWIDTH] IN BLOOD BY AUTOMATED COUNT: 13.6 % (ref 12.3–15.4)
GLOBULIN SER CALC-MCNC: 3.2 G/DL (ref 1.5–4.5)
GLUCOSE SERPL-MCNC: 89 MG/DL (ref 65–99)
HCT VFR BLD AUTO: 42.1 % (ref 37.5–51)
HGB BLD-MCNC: 13.8 G/DL (ref 12.6–17.7)
IMM GRANULOCYTES # BLD: 0.1 X10E3/UL (ref 0–0.1)
IMM GRANULOCYTES NFR BLD: 1 %
LYMPHOCYTES # BLD AUTO: 3 X10E3/UL (ref 0.7–3.1)
LYMPHOCYTES NFR BLD AUTO: 29 %
MCH RBC QN AUTO: 29.7 PG (ref 26.6–33)
MCHC RBC AUTO-ENTMCNC: 32.8 G/DL (ref 31.5–35.7)
MCV RBC AUTO: 91 FL (ref 79–97)
MONOCYTES # BLD AUTO: 1.8 X10E3/UL (ref 0.1–0.9)
MONOCYTES NFR BLD AUTO: 18 %
MORPHOLOGY BLD-IMP: ABNORMAL
NEUTROPHILS # BLD AUTO: 4.8 X10E3/UL (ref 1.4–7)
NEUTROPHILS NFR BLD AUTO: 47 %
PLATELET # BLD AUTO: 323 X10E3/UL (ref 150–379)
POTASSIUM SERPL-SCNC: 4.6 MMOL/L (ref 3.5–5.2)
PROT SERPL-MCNC: 7.6 G/DL (ref 6–8.5)
RBC # BLD AUTO: 4.65 X10E6/UL (ref 4.14–5.8)
SODIUM SERPL-SCNC: 140 MMOL/L (ref 134–144)
WBC # BLD AUTO: 10.3 X10E3/UL (ref 3.4–10.8)

## 2017-05-17 NOTE — PROGRESS NOTES
Labs shows mild dehydration with a mild increase in creatinine.  Blood count is normal.  Recommend increasing hydration to 8 glasses per day - can drink water and gatorade  I am waiting on stool studies to return

## 2017-05-19 ENCOUNTER — TELEPHONE (OUTPATIENT)
Dept: INTERNAL MEDICINE CLINIC | Age: 57
End: 2017-05-19

## 2017-05-19 DIAGNOSIS — A09 INFECTIOUS DIARRHEA IN ADULT PATIENT: Primary | ICD-10-CM

## 2017-05-19 LAB
ADENOVIRUS F 40/41: NOT DETECTED
ASTROVIRUS: NOT DETECTED
C DIFFICILE TOXIN A/B: NOT DETECTED
CAMPYLOBACTER: NOT DETECTED
CRYPTOSPORIDIUM, CRYPTOSPORIDIUM: NOT DETECTED
CYCLOSPORA CAYETANENSIS: NOT DETECTED
E COLI O157: ABNORMAL
ENTAMOEBA HISTOLYTICA: NOT DETECTED
ENTEROAGGREGATIVE E COLI: NOT DETECTED
ENTEROPATHOGENIC E COLI (EPEC), EPEC: NOT DETECTED
ENTEROTOXIGENIC E COLI (ETEC), ETEC: NOT DETECTED
GIARDIA LAMBLIA: NOT DETECTED
NOROVIRUS GI/GII: DETECTED
PLESIOMONAS SHIGELLOIDES: NOT DETECTED
ROTAVIRUS A: NOT DETECTED
SALMONELLA: NOT DETECTED
SAPOVIRUS: NOT DETECTED
SHIGA-TOXIN-PRODUCING E COLI: NOT DETECTED
SHIGELLA/ENTEROINVASIVE E COLI (EIEC), EIEC: DETECTED
VIBRIO CHOLERAE: NOT DETECTED
VIBRIO: NOT DETECTED
YERSINIA ENTEROCOLITICA: NOT DETECTED

## 2017-05-19 RX ORDER — CIPROFLOXACIN 500 MG/1
500 TABLET ORAL 2 TIMES DAILY
Qty: 20 TAB | Refills: 0 | Status: SHIPPED | OUTPATIENT
Start: 2017-05-19 | End: 2017-09-07 | Stop reason: ALTCHOICE

## 2017-05-20 NOTE — TELEPHONE ENCOUNTER
I attempted to call patient and discuss stool results showing infectious diarrhea. No answer. Unable to leave a message. Will try again tomorrow.   Prescribed ciprofloxacin

## 2017-05-22 ENCOUNTER — TELEPHONE (OUTPATIENT)
Dept: INTERNAL MEDICINE CLINIC | Age: 57
End: 2017-05-22

## 2017-05-22 DIAGNOSIS — A09 DIARRHEA OF INFECTIOUS ORIGIN: Primary | ICD-10-CM

## 2017-05-22 NOTE — TELEPHONE ENCOUNTER
Patient states he needs a refill on his Concerta. I do not see in med list. Please call to discuss or when ready for .  Thank you

## 2017-05-22 NOTE — TELEPHONE ENCOUNTER
Dr. Erum Ravi patient last seen by Dr. Desmond Mullins states he needs a call back to be advised what to do about his persistent loose bowels & diagnosis. Please call to discuss.  Thank you

## 2017-05-23 RX ORDER — METHYLPHENIDATE HYDROCHLORIDE 54 MG/1
54 TABLET ORAL DAILY
Qty: 30 TAB | Refills: 0 | Status: SHIPPED | OUTPATIENT
Start: 2017-05-23 | End: 2017-07-13 | Stop reason: SDUPTHER

## 2017-05-23 NOTE — TELEPHONE ENCOUNTER
Returned call to patient. States diarrhea returned. He has taken imodium which seems to be helping. Any recommendations at this point?

## 2017-05-24 NOTE — TELEPHONE ENCOUNTER
Spoke with patient. He will see if he has any more loose stools. If so will call and come  containers for stool culture.

## 2017-05-24 NOTE — TELEPHONE ENCOUNTER
MD Renetta Holliday, REBECCA        Caller: Unspecified (2 days ago,  3:28 PM)                     Ciprofloxacin antibiotic prescription sent to [address] [place] [address]. If possible can patient have stool culture done prior to starting antibiotics so we know that the antibiotic is effective. The stool culture ordered is placed the patient would need to  vile for stool testing in the office.

## 2017-05-25 ENCOUNTER — TELEPHONE (OUTPATIENT)
Dept: INTERNAL MEDICINE CLINIC | Age: 57
End: 2017-05-25

## 2017-05-25 NOTE — TELEPHONE ENCOUNTER
Call completed to patient, two identifiers verified. Patient advised that Rx request was approved and available for pick-up. Patient verbalized an understanding.

## 2017-05-25 NOTE — TELEPHONE ENCOUNTER
Aubrie Porter would like a status update regarding his request for Concerta.        Message received & copied from [facility]

## 2017-05-31 RX ORDER — FENOFIBRATE 145 MG/1
TABLET, COATED ORAL
Qty: 30 TAB | Refills: 10 | Status: SHIPPED | OUTPATIENT
Start: 2017-05-31 | End: 2018-10-31 | Stop reason: SDUPTHER

## 2017-06-09 DIAGNOSIS — A09 INFECTIOUS DIARRHEA IN ADULT PATIENT: ICD-10-CM

## 2017-06-09 RX ORDER — CIPROFLOXACIN 500 MG/1
TABLET ORAL
Qty: 20 TAB | Refills: 0 | OUTPATIENT
Start: 2017-06-09

## 2017-06-14 DIAGNOSIS — A09 INFECTIOUS DIARRHEA IN ADULT PATIENT: ICD-10-CM

## 2017-06-14 RX ORDER — CIPROFLOXACIN 500 MG/1
TABLET ORAL
Qty: 20 TAB | Refills: 0 | OUTPATIENT
Start: 2017-06-14

## 2017-07-13 NOTE — TELEPHONE ENCOUNTER
Pt states he needs a refill on Methylphenidate ER 54 mg. I don't see that strength as an option? Pt states he only gets every so often as he uses on a need basis only. Please call pt when this is ready.

## 2017-07-13 NOTE — TELEPHONE ENCOUNTER
Requested Prescriptions     Pending Prescriptions Disp Refills    methylphenidate ER 54 mg 24 hr tab 30 Tab 0     Sig: Take 1 Tab (54 mg total) by mouth dailyEarliest Fill Date: 7/13/17.      Last Refill: 5/23/17    Last Office Visit: 3/7/17    Upcoming Appointment: 9/7/17

## 2017-07-14 RX ORDER — METHYLPHENIDATE HYDROCHLORIDE 54 MG/1
54 TABLET ORAL DAILY
Qty: 30 TAB | Refills: 0 | Status: SHIPPED | OUTPATIENT
Start: 2017-07-14 | End: 2017-09-07 | Stop reason: SDUPTHER

## 2017-07-14 NOTE — TELEPHONE ENCOUNTER
Call completed to patient, two identifiers verified. Patient advised that Rx was approved and is available for pick-up.

## 2017-08-14 RX ORDER — DARUNAVIR 800 MG/1
800 TABLET, FILM COATED ORAL
Qty: 90 TAB | Refills: 3 | Status: SHIPPED | OUTPATIENT
Start: 2017-08-14 | End: 2018-03-20 | Stop reason: SDUPTHER

## 2017-08-14 RX ORDER — DOLUTEGRAVIR SODIUM 50 MG/1
TABLET, FILM COATED ORAL
Qty: 90 TAB | Refills: 3 | Status: SHIPPED | OUTPATIENT
Start: 2017-08-14 | End: 2018-03-20 | Stop reason: SDUPTHER

## 2017-08-15 RX ORDER — LISINOPRIL 40 MG/1
TABLET ORAL
Qty: 30 TAB | Refills: 10 | Status: SHIPPED | OUTPATIENT
Start: 2017-08-15 | End: 2017-08-21 | Stop reason: SDUPTHER

## 2017-08-21 RX ORDER — ATORVASTATIN CALCIUM 20 MG/1
TABLET, FILM COATED ORAL
Qty: 30 TAB | Refills: 10 | Status: SHIPPED | OUTPATIENT
Start: 2017-08-21 | End: 2017-09-07 | Stop reason: ALTCHOICE

## 2017-08-21 RX ORDER — METOPROLOL SUCCINATE 100 MG/1
TABLET, EXTENDED RELEASE ORAL
Qty: 30 TAB | Refills: 8 | Status: SHIPPED | OUTPATIENT
Start: 2017-08-21 | End: 2018-11-13 | Stop reason: SDUPTHER

## 2017-08-21 RX ORDER — LISINOPRIL 40 MG/1
TABLET ORAL
Qty: 30 TAB | Refills: 10 | Status: SHIPPED | OUTPATIENT
Start: 2017-08-21 | End: 2018-10-23 | Stop reason: SDUPTHER

## 2017-09-07 ENCOUNTER — OFFICE VISIT (OUTPATIENT)
Dept: INTERNAL MEDICINE CLINIC | Age: 57
End: 2017-09-07

## 2017-09-07 VITALS
SYSTOLIC BLOOD PRESSURE: 106 MMHG | TEMPERATURE: 97.8 F | BODY MASS INDEX: 30.48 KG/M2 | HEIGHT: 73 IN | DIASTOLIC BLOOD PRESSURE: 68 MMHG | OXYGEN SATURATION: 98 % | HEART RATE: 54 BPM | WEIGHT: 230 LBS

## 2017-09-07 DIAGNOSIS — F98.8 ADD (ATTENTION DEFICIT DISORDER): ICD-10-CM

## 2017-09-07 DIAGNOSIS — B20 HUMAN IMMUNODEFICIENCY VIRUS (HIV) DISEASE (HCC): ICD-10-CM

## 2017-09-07 DIAGNOSIS — R79.89 LFT ELEVATION: ICD-10-CM

## 2017-09-07 DIAGNOSIS — E78.5 DYSLIPIDEMIA: ICD-10-CM

## 2017-09-07 DIAGNOSIS — Z12.5 PROSTATE CANCER SCREENING: ICD-10-CM

## 2017-09-07 DIAGNOSIS — I10 ESSENTIAL HYPERTENSION: Primary | ICD-10-CM

## 2017-09-07 DIAGNOSIS — Z23 ENCOUNTER FOR IMMUNIZATION: ICD-10-CM

## 2017-09-07 RX ORDER — ATORVASTATIN CALCIUM 40 MG/1
TABLET, FILM COATED ORAL DAILY
COMMUNITY
End: 2018-03-20

## 2017-09-07 RX ORDER — METHYLPHENIDATE HYDROCHLORIDE 54 MG/1
54 TABLET ORAL DAILY
Qty: 30 TAB | Refills: 0 | Status: SHIPPED | OUTPATIENT
Start: 2017-09-07 | End: 2017-11-01 | Stop reason: SDUPTHER

## 2017-09-07 NOTE — PROGRESS NOTES
HISTORY OF PRESENT ILLNESS  Muna Hodge is a 64 y.o. male. HPI      F/u HTN dyslipidemia, ADD GERD, low testosterone  Exercises 6 days per week  Having increased sciatic on right side, keeps him up at night x 2 weeks. Diclofenac helps and wants a refill  Overall the sciatica sxs are better since training,swimming  alleve helps the pain  Pt hopes to get off high blood pressure medicines at some point  Working in [employer]--takes classes at [employer]  riltalin helps in class and with reading per pt    Patient Active Problem List    Diagnosis Date Noted    LFT elevation 01/30/2015    HTN (hypertension) 09/17/2013    Low serum testosterone level 09/17/2013    Dyslipidemia 09/17/2013    Human immunodeficiency virus (HIV) disease ([address] 01/25/2013    ADD (attention deficit disorder) 01/25/2013     Current Outpatient Prescriptions   Medication Sig Dispense Refill    atorvastatin (LIPITOR) 40 mg tablet Take  by mouth daily.  darunavir (PREZISTA) 800 mg tablet Take  by mouth.  metoprolol succinate (TOPROL-XL) 100 mg tablet TAKE ONE TABLET BY MOUTH DAILY 30 Tab 8    lisinopril (PRINIVIL, ZESTRIL) 40 mg tablet TAKE ONE TABLET BY MOUTH DAILY 30 Tab 10    TIVICAY 50 mg tab tablet TAKE ONE TABLET BY MOUTH DAILY 90 Tab 3    PREZISTA 800 mg tablet Take 1 Tab by mouth daily (with breakfast). 90 Tab 3    fenofibrate nanocrystallized (TRICOR) 145 mg tablet TAKE ONE TABLET BY MOUTH DAILY 30 Tab 10    cholecalciferol (VITAMIN D3) 1,000 unit cap Take  by mouth daily.  testosterone (ANDROGEL) 20.25 mg/1.25 gram (1.62 %) gel Apply 40.5 mg to affected area daily. Max Daily Amount: 40.5 mg. 1 Bottle 5    EDURANT 25 mg tab tablet TAKE ONE TABLET BY MOUTH DAILY 90 Tab 4    hydrochlorothiazide (HYDRODIURIL) 25 mg tablet Take 1 Tab by mouth daily. 30 Tab 6    MULTIVITAMIN PO Take  by mouth.  methylphenidate ER 54 mg 24 hr tab Take 1 Tab (54 mg total) by mouth dailyEarliest Fill Date: 7/14/17. 30 Tab 0    diclofenac EC (VOLTAREN) 50 mg EC tablet One bid prn pain 30 Tab 5    ranitidine (ZANTAC) 300 mg tablet Take 1 Tab by mouth daily. 30 Tab 11    sildenafil citrate (VIAGRA) 100 mg tablet Take 1 Tab by mouth as needed. 3 Tab 11    ritonavir (NORVIR) 100 mg capsule Take 1 Cap by mouth two (2) times a day. 180 Cap 5     Allergies   Allergen Reactions    Sustiva [Efavirenz] Vertigo     nightmares      Lab Results  Component Value Date/Time   Glucose 89 05/15/2017 03:57 PM   LDL, calculated 131 08/18/2016 10:50 AM   Creatinine 1.27 05/15/2017 03:57 PM      Lab Results  Component Value Date/Time   Cholesterol, total 233 08/18/2016 10:50 AM   HDL Cholesterol 34 08/18/2016 10:50 AM   LDL, calculated 131 08/18/2016 10:50 AM   Triglyceride 338 08/18/2016 10:50 AM   CHOL/HDL Ratio 6.6 01/06/2010 11:47 AM     Lab Results  Component Value Date/Time   GFR est non-AA 63 05/15/2017 03:57 PM   GFR est AA 73 05/15/2017 03:57 PM   Creatinine 1.27 05/15/2017 03:57 PM   BUN 28 05/15/2017 03:57 PM   Sodium 140 05/15/2017 03:57 PM   Potassium 4.6 05/15/2017 03:57 PM   Chloride 98 05/15/2017 03:57 PM   CO2 24 05/15/2017 03:57 PM          ROS    Physical Exam   Constitutional: He appears well-developed and well-nourished. No distress. Appears stated age   HENT:   Head: Normocephalic. Cardiovascular: Normal rate, regular rhythm and normal heart sounds. Pulmonary/Chest: Effort normal and breath sounds normal.   Abdominal: Soft. Musculoskeletal: He exhibits no edema. Neurological: He is alert. Psychiatric: He has a normal mood and affect. Nursing note and vitals reviewed. ASSESSMENT and PLAN  Diagnoses and all orders for this visit:    1. Essential hypertension   controlled  2. Dyslipidemia   Continue lipitor   Lipid panel  3. LFT elevation    4. ADD (attention deficit disorder)   Controlled on ritalin takes 1/2 tab every day   Refill 30 tabs for 2 months  5. Prostate screen   PSA  6. Screening/preventive   Flu shot and pneumovax 23 today      RTC 6 months    Follow-up Disposition: Not on File

## 2017-09-07 NOTE — MR AVS SNAPSHOT
Visit Information Date & Time Provider Department Dept. Phone Encounter #  
 9/7/2017  9:30 AM Jolene Haywood, [address] [place] [address],[address] [phone] [id number] Follow-up Instructions Return in about 6 months (around 3/7/2018) for htn hld add. Upcoming Health Maintenance Date Due Pneumococcal 19-64 Highest Risk (2 of 3 - PPSV23) 3/26/2015 INFLUENZA AGE 9 TO ADULT 8/1/2017 COLONOSCOPY 9/24/2021 DTaP/Tdap/Td series (2 - Td) 8/18/2026 Allergies as of 9/7/2017  Review Complete On: 9/7/2017 By: Irene Viramontes LPN Severity Noted Reaction Type Reactions Sustiva [Efavirenz]  11/15/2011    Vertigo  
 nightmares Current Immunizations  Reviewed on 1/23/2017 Name Date Influenza Vaccine 9/26/2016, 9/16/2014, 9/16/2013 Influenza Vaccine (Quad) PF 10/22/2015 Influenza Vaccine PF 9/17/2013 Pneumococcal Conjugate (PCV-13) 1/29/2015 Pneumococcal Conjugate (PCV-7) 6/17/2013 Tdap 8/18/2016 10:35 AM  
  
 Not reviewed this visit You Were Diagnosed With   
  
 Codes Comments Essential hypertension    -  Primary ICD-10-CM: I10 
ICD-9-CM: 401.9 Dyslipidemia     ICD-10-CM: E78.5 ICD-9-CM: 272.4 LFT elevation     ICD-10-CM: R94.5 ICD-9-CM: 790.6 ADD (attention deficit disorder)     ICD-10-CM: F98.8 ICD-9-CM: 314.00 Prostate cancer screening     ICD-10-CM: Z12.5 ICD-9-CM: V76.44 Human immunodeficiency virus (HIV) disease ([facility]     ICD-10-CM: B20 
ICD-9-CM: 922 Vitals BP Pulse Temp Height(growth percentile) Weight(growth percentile) SpO2  
 106/68 (BP 1 Location: Left arm, BP Patient Position: Sitting) (!) 54 97.8 °F (36.6 °C) (Oral) 6' 1\" (1.854 m) 230 lb (104.3 kg) 98% BMI Smoking Status 30.34 kg/m2 Former Smoker BMI and BSA Data Body Mass Index Body Surface Area  
 30.34 kg/m 2 2.32 m 2 Preferred Pharmacy Pharmacy Name Phone YEISON KYLE [facility] [id number] [facility] [phone] Your Updated Medication List  
  
   
This list is accurate as of: 9/7/17 10:13 AM.  Always use your most recent med list.  
  
  
  
  
 atorvastatin 40 mg tablet Commonly known as:  LIPITOR Take  by mouth daily. diclofenac EC 50 mg EC tablet Commonly known as:  VOLTAREN One bid prn pain EDURANT 25 mg Tab tablet Generic drug:  rilpivirine TAKE ONE TABLET BY MOUTH DAILY  
  
 fenofibrate nanocrystallized 145 mg tablet Commonly known as:  TRICOR  
TAKE ONE TABLET BY MOUTH DAILY  
  
 hydroCHLOROthiazide 25 mg tablet Commonly known as:  HYDRODIURIL Take 1 Tab by mouth daily. lisinopril 40 mg tablet Commonly known as:  PRINIVIL, ZESTRIL  
TAKE ONE TABLET BY MOUTH DAILY  
  
 methylphenidate HCl 54 mg CR tablet Commonly known as:  CONCERTA Take 1 Tab (54 mg total) by mouth daily. metoprolol succinate 100 mg tablet Commonly known as:  TOPROL-XL  
TAKE ONE TABLET BY MOUTH DAILY MULTIVITAMIN PO Take  by mouth. * PREZISTA 800 mg tablet Generic drug:  darunavir Take  by mouth. * PREZISTA 800 mg tablet Generic drug:  darunavir Take 1 Tab by mouth daily (with breakfast). raNITIdine 300 mg tablet Commonly known as:  ZANTAC Take 1 Tab by mouth daily. ritonavir 100 mg capsule Commonly known as:  Garnell Mcardle Take 1 Cap by mouth two (2) times a day. sildenafil citrate 100 mg tablet Commonly known as:  VIAGRA Take 1 Tab by mouth as needed. testosterone 20.25 mg/1.25 gram (1.62 %) gel Commonly known as:  ANDROGEL Apply 40.5 mg to affected area daily. Max Daily Amount: 40.5 mg.  
  
 TIVICAY 50 mg Tab tablet Generic drug:  dolutegravir TAKE ONE TABLET BY MOUTH DAILY  
  
 VITAMIN D3 1,000 unit Cap Generic drug:  cholecalciferol Take  by mouth daily. * Notice: This list has 2 medication(s) that are the same as other medications prescribed for you. Read the directions carefully, and ask your doctor or other care provider to review them with you. Prescriptions Printed Refills  
 methylphenidate ER 54 mg 24 hr tab 0 Sig: Take 1 Tab (54 mg total) by mouth daily. Class: Print Route: Oral  
  
We Performed the Following LIPID PANEL [94813 CPT(R)] PSA, DIAGNOSTIC (PROSTATE SPECIFIC AG) [id number] CPT(R)] REFERRAL TO INFECTIOUS DISEASE [REF37 Custom] Comments:  
 Please evaluate patient for f/u HIV Follow-up Instructions Return in about 6 months (around 3/7/2018) for htn hld add. Referral Information Referral ID Referred By Referred To  
  
 [id number] ISABEL ARGUETA, [address] [facility] [address] [place], [address] [place] [address] Av Phone: [phone] Fax: [phone] Visits Status Start Date End Date 1 New Request 9/7/17 9/7/18 If your referral has a status of pending review or denied, additional information will be sent to support the outcome of this decision. Introducing [facility] & HEALTH SERVICES! Dear Ace Coles: Thank you for requesting a jellyfish account. Our records indicate that you already have an active jellyfish account. You can access your account anytime at [email or web address] Did you know that you can access your hospital and ER discharge instructions at any time in jellyfish? You can also review all of your test results from your hospital stay or ER visit. Additional Information If you have questions, please visit the Frequently Asked Questions section of the jellyfish website at [email or web address] Remember, jellyfish is NOT to be used for urgent needs. For medical emergencies, dial 911. Now available from your iPhone and Android! Please provide this summary of care documentation to your next provider. Your primary care clinician is listed as Luiz ARGUETA. If you have any questions after today's visit, please call [phone].

## 2017-09-08 LAB
CHOLEST SERPL-MCNC: 182 MG/DL (ref 100–199)
HDLC SERPL-MCNC: 40 MG/DL
LDLC SERPL CALC-MCNC: 104 MG/DL (ref 0–99)
PSA SERPL-MCNC: 0.6 NG/ML (ref 0–4)
TRIGL SERPL-MCNC: 189 MG/DL (ref 0–149)
VLDLC SERPL CALC-MCNC: 38 MG/DL (ref 5–40)

## 2017-11-01 ENCOUNTER — OFFICE VISIT (OUTPATIENT)
Dept: INTERNAL MEDICINE CLINIC | Age: 57
End: 2017-11-01

## 2017-11-01 VITALS
WEIGHT: 232 LBS | OXYGEN SATURATION: 97 % | TEMPERATURE: 97.5 F | HEART RATE: 57 BPM | BODY MASS INDEX: 30.75 KG/M2 | SYSTOLIC BLOOD PRESSURE: 130 MMHG | HEIGHT: 73 IN | DIASTOLIC BLOOD PRESSURE: 74 MMHG

## 2017-11-01 DIAGNOSIS — F98.8 ATTENTION DEFICIT DISORDER (ADD) WITHOUT HYPERACTIVITY: ICD-10-CM

## 2017-11-01 DIAGNOSIS — E78.5 DYSLIPIDEMIA: ICD-10-CM

## 2017-11-01 DIAGNOSIS — I10 ESSENTIAL HYPERTENSION: ICD-10-CM

## 2017-11-01 DIAGNOSIS — R79.89 LOW SERUM TESTOSTERONE LEVEL: ICD-10-CM

## 2017-11-01 DIAGNOSIS — Z00.00 ROUTINE GENERAL MEDICAL EXAMINATION AT A HEALTH CARE FACILITY: Primary | ICD-10-CM

## 2017-11-01 RX ORDER — METHYLPHENIDATE HYDROCHLORIDE 54 MG/1
54 TABLET ORAL DAILY
Qty: 30 TAB | Refills: 0 | Status: SHIPPED | OUTPATIENT
Start: 2017-11-01 | End: 2018-01-05 | Stop reason: SDUPTHER

## 2017-11-01 RX ORDER — VITAMIN E 268 MG
CAPSULE ORAL DAILY
COMMUNITY

## 2017-11-01 NOTE — PROGRESS NOTES
Patient is here today for his yearly physical with   Fasting labs. Patient is requesting a prescription  refill for methylphenidate. No new complaints.

## 2017-11-01 NOTE — MR AVS SNAPSHOT
Visit Information Date & Time Provider Department Dept. Phone Encounter #  
 11/1/2017 10:00 AM Dennys Cordero, [address] [place] [address],[address] [phone] [id number] Follow-up Instructions Return in about 6 months (around 5/1/2018) for htn hld. Upcoming Health Maintenance Date Due COLONOSCOPY 9/24/2021 Pneumococcal 19-64 Highest Risk (3 of 3 - PPSV23) 9/7/2022 DTaP/Tdap/Td series (2 - Td) 8/18/2026 Allergies as of 11/1/2017  Review Complete On: 11/1/2017 By: Jan Andres LPN Severity Noted Reaction Type Reactions Sustiva [Efavirenz]  11/15/2011    Vertigo  
 nightmares Current Immunizations  Reviewed on 1/23/2017 Name Date Influenza Vaccine 9/7/2017, 9/26/2016, 9/16/2014, 9/16/2013 Influenza Vaccine (Quad) PF 9/7/2017, 10/22/2015 Influenza Vaccine PF 9/17/2013 Pneumococcal Conjugate (PCV-13) 1/29/2015 Pneumococcal Conjugate (PCV-7) 6/17/2013 Pneumococcal Polysaccharide (PPSV-23) 9/7/2017 Tdap 8/18/2016 10:35 AM  
  
 Not reviewed this visit You Were Diagnosed With   
  
 Codes Comments Routine general medical examination at a health care facility    -  Primary ICD-10-CM: Z00.00 ICD-9-CM: V70.0 Essential hypertension     ICD-10-CM: I10 
ICD-9-CM: 401.9 Dyslipidemia     ICD-10-CM: E78.5 ICD-9-CM: 272.4 Attention deficit disorder (ADD) without hyperactivity     ICD-10-CM: F98.8 ICD-9-CM: 314.00 Vitals BP Pulse Temp Height(growth percentile) Weight(growth percentile) SpO2  
 130/74 (BP 1 Location: Left arm, BP Patient Position: Sitting) (!) 57 97.5 °F (36.4 °C) (Oral) 6' 1\" (1.854 m) 232 lb (105.2 kg) 97% BMI Smoking Status 30.61 kg/m2 Former Smoker BMI and BSA Data Body Mass Index Body Surface Area  
 30.61 kg/m 2 2.33 m 2 Preferred Pharmacy Pharmacy Name Phone [facility] [place] [address] [facility] [phone] Your Updated Medication List  
  
   
This list is accurate as of: 11/1/17 10:50 AM.  Always use your most recent med list.  
  
  
  
  
 atorvastatin 40 mg tablet Commonly known as:  LIPITOR Take  by mouth daily. EDURANT 25 mg Tab tablet Generic drug:  rilpivirine TAKE ONE TABLET BY MOUTH DAILY  
  
 fenofibrate nanocrystallized 145 mg tablet Commonly known as:  TRICOR  
TAKE ONE TABLET BY MOUTH DAILY  
  
 lisinopril 40 mg tablet Commonly known as:  PRINIVIL, ZESTRIL  
TAKE ONE TABLET BY MOUTH DAILY  
  
 methylphenidate HCl 54 mg CR tablet Commonly known as:  CONCERTA Take 1 Tab (54 mg total) by mouth daily. metoprolol succinate 100 mg tablet Commonly known as:  TOPROL-XL  
TAKE ONE TABLET BY MOUTH DAILY MULTIVITAMIN PO Take  by mouth. PREZISTA 800 mg tablet Generic drug:  darunavir Take 1 Tab by mouth daily (with breakfast). ritonavir 100 mg capsule Commonly known as:  Ceclia Abt Take 1 Cap by mouth two (2) times a day. sildenafil citrate 100 mg tablet Commonly known as:  VIAGRA Take 1 Tab by mouth as needed. testosterone 20.25 mg/1.25 gram (1.62 %) gel Commonly known as:  ANDROGEL Apply 40.5 mg to affected area daily. Max Daily Amount: 40.5 mg.  
  
 TIVICAY 50 mg Tab tablet Generic drug:  dolutegravir TAKE ONE TABLET BY MOUTH DAILY  
  
 VITAMIN D3 1,000 unit Cap Generic drug:  cholecalciferol Take  by mouth daily. vitamin E 400 unit capsule Commonly known as:  [address] Take  by mouth daily. Prescriptions Printed Refills  
 methylphenidate ER 54 mg 24 hr tab 0 Sig: Take 1 Tab (54 mg total) by mouth daily. Class: Print Route: Oral  
  
We Performed the Following CBC W/O DIFF [59588 CPT(R)] LIPID PANEL [51157 CPT(R)] METABOLIC PANEL, COMPREHENSIVE [00003 CPT(R)] TSH 3RD GENERATION [14109 CPT(R)] Follow-up Instructions Return in about 6 months (around 5/1/2018) for htn hld. Introducing [facility] & HEALTH SERVICES! Dear Alina Credit: Thank you for requesting a [employer] account. Our records indicate that you already have an active [employer] account. You can access your account anytime at [email or web address] Did you know that you can access your hospital and ER discharge instructions at any time in [employer]? You can also review all of your test results from your hospital stay or ER visit. Additional Information If you have questions, please visit the Frequently Asked Questions section of the [employer] website at [email or web address] Remember, [employer] is NOT to be used for urgent needs. For medical emergencies, dial 911. Now available from your iPhone and Android! Please provide this summary of care documentation to your next provider. Your primary care clinician is listed as Nika ARGUETA. If you have any questions after today's visit, please call [phone].

## 2017-11-01 NOTE — PROGRESS NOTES
HISTORY OF PRESENT ILLNESS  Karthikeyan Ramirez is a 64 y.o. male. HPI      F/u HTN dyslipidemia, ADD GERD, low testosterone  Takes ritalin 1/2 tab every day-needs refill  Pt not taking hctz from what he recalls  Signed up for exercise program again with --light weights and CV exercises in his house  Overall the sciatica sxs are better since training,swimming  alleve helps the pain  Pt hopes to get off high blood pressure medicines at some point  Working in [employer]--takes classes at [employer]  riltalin helps in class and with reading per pt  Has restarted testosterone gel  Has not yet established with another ID MD-last HIV RNA level was undetectable. Has been referred to see new ID MD at [id number] [facility]    Patient Active Problem List    Diagnosis Date Noted    LFT elevation 01/30/2015    HTN (hypertension) 09/17/2013    Low serum testosterone level 09/17/2013    Dyslipidemia 09/17/2013    Human immunodeficiency virus (HIV) disease 01/25/2013    ADD (attention deficit disorder) 01/25/2013     Current Outpatient Prescriptions   Medication Sig Dispense Refill    vitamin E (AQUA GEMS) 400 unit capsule Take  by mouth daily.  methylphenidate ER 54 mg 24 hr tab Take 1 Tab (54 mg total) by mouth daily. 30 Tab 0    atorvastatin (LIPITOR) 40 mg tablet Take  by mouth daily.  metoprolol succinate (TOPROL-XL) 100 mg tablet TAKE ONE TABLET BY MOUTH DAILY 30 Tab 8    lisinopril (PRINIVIL, ZESTRIL) 40 mg tablet TAKE ONE TABLET BY MOUTH DAILY 30 Tab 10    TIVICAY 50 mg tab tablet TAKE ONE TABLET BY MOUTH DAILY 90 Tab 3    PREZISTA 800 mg tablet Take 1 Tab by mouth daily (with breakfast). 90 Tab 3    fenofibrate nanocrystallized (TRICOR) 145 mg tablet TAKE ONE TABLET BY MOUTH DAILY 30 Tab 10    cholecalciferol (VITAMIN D3) 1,000 unit cap Take  by mouth daily.  testosterone (ANDROGEL) 20.25 mg/1.25 gram (1.62 %) gel Apply 40.5 mg to affected area daily.  Max Daily Amount: 40.5 mg. 1 Bottle 5    EDURANT 25 mg tab tablet TAKE ONE TABLET BY MOUTH DAILY 90 Tab 4    ritonavir (NORVIR) 100 mg capsule Take 1 Cap by mouth two (2) times a day. 180 Cap 5    MULTIVITAMIN PO Take  by mouth.  sildenafil citrate (VIAGRA) 100 mg tablet Take 1 Tab by mouth as needed. 3 Tab 11     Allergies   Allergen Reactions    Sustiva [Efavirenz] Vertigo     nightmares     Social History   Substance Use Topics    Smoking status: Former Smoker    Smokeless tobacco: Never Used    Alcohol use 0.0 - 0.5 oz/week     0 - 1 Standard drinks or equivalent per week      Lab Results  Component Value Date/Time   Glucose 89 05/15/2017 03:57 PM   LDL, calculated 104 09/07/2017 10:45 AM   Creatinine 1.27 05/15/2017 03:57 PM      Lab Results  Component Value Date/Time   Cholesterol, total 182 09/07/2017 10:45 AM   HDL Cholesterol 40 09/07/2017 10:45 AM   LDL, calculated 104 09/07/2017 10:45 AM   Triglyceride 189 09/07/2017 10:45 AM   CHOL/HDL Ratio 6.6 01/06/2010 11:47 AM     Lab Results  Component Value Date/Time   GFR est non-AA 63 05/15/2017 03:57 PM   GFR est AA 73 05/15/2017 03:57 PM   Creatinine 1.27 05/15/2017 03:57 PM   BUN 28 05/15/2017 03:57 PM   Sodium 140 05/15/2017 03:57 PM   Potassium 4.6 05/15/2017 03:57 PM   Chloride 98 05/15/2017 03:57 PM   CO2 24 05/15/2017 03:57 PM          Review of Systems   Constitutional: Negative for chills, fever, malaise/fatigue and weight loss. Eyes: Negative for blurred vision and double vision. Respiratory: Negative for cough and shortness of breath. Cardiovascular: Negative for chest pain and palpitations. Gastrointestinal: Negative for abdominal pain, blood in stool, constipation, diarrhea, melena, nausea and vomiting. Genitourinary: Negative for dysuria, frequency, hematuria and urgency. Musculoskeletal: Negative for back pain, falls, joint pain and myalgias. Neurological: Negative for dizziness, tremors and headaches.        Physical Exam   Constitutional: He appears well-developed and well-nourished. No distress. Appears stated age   HENT:   Head: Normocephalic. Neck: No JVD present. No tracheal deviation present. No thyromegaly present. Cardiovascular: Normal rate, regular rhythm and normal heart sounds. Exam reveals no gallop and no friction rub. No murmur heard. Pulmonary/Chest: Effort normal and breath sounds normal. No respiratory distress. He has no wheezes. He has no rales. He exhibits no tenderness. Abdominal: Soft. He exhibits no distension and no mass. There is no tenderness. There is no rebound and no guarding. Musculoskeletal: He exhibits no edema. Lymphadenopathy:     He has no cervical adenopathy. Neurological: He is alert. Psychiatric: He has a normal mood and affect. Nursing note and vitals reviewed. ASSESSMENT and PLAN  Diagnoses and all orders for this visit:    1. Routine general medical examination at a health care facility  -     CBC W/O DIFF  -     METABOLIC PANEL, COMPREHENSIVE  -     LIPID PANEL  -     TSH 3RD GENERATION   Pt will try to lose weight with exercise/ and low calorie diet    2. Essential hypertension   Controlled, continue medcines   Pt was asked to clarify if taking HCTZ  3. Dyslipidemia   On statin and tricor  4. Attention deficit disorder (ADD) without hyperactivity   Controlled on 1/2 tab ritalin every day-refilled 30 tabs  5. Low serum testosterone level  -     TESTOSTERONE, TOTAL, ADULT MALE    Other orders  -     methylphenidate ER 54 mg 24 hr tab; Take 1 Tab (54 mg total) by mouth daily. Follow-up Disposition:  Return in about 6 months (around 5/1/2018) for htn hld.

## 2017-11-02 LAB
ALBUMIN SERPL-MCNC: 4.3 G/DL (ref 3.5–5.5)
ALBUMIN/GLOB SERPL: 1.5 {RATIO} (ref 1.2–2.2)
ALP SERPL-CCNC: 46 IU/L (ref 39–117)
ALT SERPL-CCNC: 38 IU/L (ref 0–44)
AST SERPL-CCNC: 46 IU/L (ref 0–40)
BILIRUB SERPL-MCNC: 0.5 MG/DL (ref 0–1.2)
BUN SERPL-MCNC: 22 MG/DL (ref 6–24)
BUN/CREAT SERPL: 22 (ref 9–20)
CALCIUM SERPL-MCNC: 9.5 MG/DL (ref 8.7–10.2)
CHLORIDE SERPL-SCNC: 102 MMOL/L (ref 96–106)
CHOLEST SERPL-MCNC: 200 MG/DL (ref 100–199)
CO2 SERPL-SCNC: 23 MMOL/L (ref 18–29)
CREAT SERPL-MCNC: 1.01 MG/DL (ref 0.76–1.27)
ERYTHROCYTE [DISTWIDTH] IN BLOOD BY AUTOMATED COUNT: 13.4 % (ref 12.3–15.4)
GFR SERPLBLD CREATININE-BSD FMLA CKD-EPI: 83 ML/MIN/1.73
GFR SERPLBLD CREATININE-BSD FMLA CKD-EPI: 96 ML/MIN/1.73
GLOBULIN SER CALC-MCNC: 2.9 G/DL (ref 1.5–4.5)
GLUCOSE SERPL-MCNC: 90 MG/DL (ref 65–99)
HCT VFR BLD AUTO: 40.5 % (ref 37.5–51)
HDLC SERPL-MCNC: 42 MG/DL
HGB BLD-MCNC: 13.7 G/DL (ref 12.6–17.7)
LDLC SERPL CALC-MCNC: 121 MG/DL (ref 0–99)
MCH RBC QN AUTO: 29.7 PG (ref 26.6–33)
MCHC RBC AUTO-ENTMCNC: 33.8 G/DL (ref 31.5–35.7)
MCV RBC AUTO: 88 FL (ref 79–97)
PLATELET # BLD AUTO: 254 X10E3/UL (ref 150–379)
POTASSIUM SERPL-SCNC: 4.5 MMOL/L (ref 3.5–5.2)
PROT SERPL-MCNC: 7.2 G/DL (ref 6–8.5)
RBC # BLD AUTO: 4.61 X10E6/UL (ref 4.14–5.8)
SODIUM SERPL-SCNC: 142 MMOL/L (ref 134–144)
TESTOST SERPL-MCNC: 281 NG/DL (ref 264–916)
TRIGL SERPL-MCNC: 187 MG/DL (ref 0–149)
TSH SERPL DL<=0.005 MIU/L-ACNC: 2.46 UIU/ML (ref 0.45–4.5)
VLDLC SERPL CALC-MCNC: 37 MG/DL (ref 5–40)
WBC # BLD AUTO: 7.7 X10E3/UL (ref 3.4–10.8)

## 2018-01-05 DIAGNOSIS — R79.89 LOW TESTOSTERONE: ICD-10-CM

## 2018-01-05 DIAGNOSIS — F98.8 ATTENTION DEFICIT DISORDER, UNSPECIFIED HYPERACTIVITY PRESENCE: Primary | ICD-10-CM

## 2018-01-05 RX ORDER — TESTOSTERONE 20.25 MG/1.25G
40.5 GEL TOPICAL DAILY
Qty: 1 BOTTLE | Refills: 5 | Status: SHIPPED | OUTPATIENT
Start: 2018-01-05 | End: 2018-09-26 | Stop reason: SDUPTHER

## 2018-01-05 RX ORDER — METHYLPHENIDATE HYDROCHLORIDE 54 MG/1
54 TABLET ORAL DAILY
Qty: 30 TAB | Refills: 0 | Status: SHIPPED | OUTPATIENT
Start: 2018-01-05 | End: 2018-03-06 | Stop reason: SDUPTHER

## 2018-01-05 NOTE — TELEPHONE ENCOUNTER
----- Message from [facility] sent at 1/5/2018  2:13 PM EST -----  Regarding: Dr. Karis Easton  Pt needs a refill on his androgel and ADHD meds. Pt would like to be called when both RX are ready for . Pt can be reached at [phone].          Message copied/pasted from [facility]

## 2018-03-06 DIAGNOSIS — F98.8 ATTENTION DEFICIT DISORDER, UNSPECIFIED HYPERACTIVITY PRESENCE: ICD-10-CM

## 2018-03-06 RX ORDER — METHYLPHENIDATE HYDROCHLORIDE 54 MG/1
54 TABLET ORAL DAILY
Qty: 30 TAB | Refills: 0 | Status: SHIPPED | OUTPATIENT
Start: 2018-03-06 | End: 2018-08-09 | Stop reason: SDUPTHER

## 2018-03-06 RX ORDER — METHYLPHENIDATE HYDROCHLORIDE 54 MG/1
TABLET ORAL
Qty: 30 TAB | Refills: 0 | Status: SHIPPED | OUTPATIENT
Start: 2018-05-01 | End: 2018-08-31 | Stop reason: SDUPTHER

## 2018-03-06 RX ORDER — METHYLPHENIDATE HYDROCHLORIDE 54 MG/1
TABLET ORAL
Qty: 30 TAB | Refills: 0 | Status: SHIPPED | OUTPATIENT
Start: 2018-04-03 | End: 2018-08-31 | Stop reason: SDUPTHER

## 2018-03-20 ENCOUNTER — OFFICE VISIT (OUTPATIENT)
Dept: FAMILY MEDICINE CLINIC | Age: 58
End: 2018-03-20

## 2018-03-20 VITALS
WEIGHT: 239.2 LBS | HEIGHT: 73 IN | BODY MASS INDEX: 31.7 KG/M2 | RESPIRATION RATE: 18 BRPM | SYSTOLIC BLOOD PRESSURE: 126 MMHG | HEART RATE: 68 BPM | OXYGEN SATURATION: 98 % | DIASTOLIC BLOOD PRESSURE: 82 MMHG | TEMPERATURE: 96.9 F

## 2018-03-20 DIAGNOSIS — B20 AIDS (ACQUIRED IMMUNE DEFICIENCY SYNDROME) (HCC): ICD-10-CM

## 2018-03-20 DIAGNOSIS — B20 HIV (HUMAN IMMUNODEFICIENCY VIRUS INFECTION) (HCC): Primary | ICD-10-CM

## 2018-03-20 DIAGNOSIS — Z23 ENCOUNTER FOR IMMUNIZATION: ICD-10-CM

## 2018-03-20 RX ORDER — DARUNAVIR 800 MG/1
800 TABLET, FILM COATED ORAL
Qty: 30 TAB | Refills: 1 | Status: SHIPPED | OUTPATIENT
Start: 2018-03-20 | End: 2019-05-07

## 2018-03-20 NOTE — PROGRESS NOTES
Reason for visit: Initial HIV Evaluation      HPI:  Mr Dayton Goss is a 62y.o. year old gentleman with hx of HIV dx in 1985 and MSM risk factor. He does not recall his CD4 on diagnosis/faby CD4. He calls having esophageal thrush but no other Opportunistic infections to his knowledge. He denied hx of syphilis, gonorrhea, chlamydia as well. He has been on multiple ART in past including AZT, Sustiva, Edurant that he recalls. Not sure what other ART regimens he has been on but says he has had several changes in the years. His last regimen is boosted Prezista, [place] and Tivicay. He says that he \"ran out \" of Norvir and has not been taking it for 2 months but has been taking other ART. His major concern today is that he wants to eat better and work out more and lose weight. He also recalls that he has had a mass like lesion on his L chest wall area that he has had a mammogram for in past.  Says that he does not think it is enlarging more, or painful. His  , who also has HIV and is on ART per Mr Dayton Goss has had anal warts. He himself denied any hx of anal warts of genital discharge/lesions. Says he has a few skin tags on his back. Otherwise, he did not have any specific symptoms today. Past Medical History:  Past Medical History:   Diagnosis Date    ADD (attention deficit disorder)     HIV positive ([facility] [address] 1985     Breast mass ( L chest)     Past Surgical History:  History reviewed. No pertinent surgical history.     Family History:  Family History   Problem Relation Age of Onset    Hypertension Mother     Hypertension Sister     Hypertension Brother        Social History:     · Living Situation: lives wt , , works as security office at [employer]   · Tobacco:denied  · Alcohol:denied   · Illicit Drugs:denied   · Sexual History: MSM , currently one single partner,    · Travel History: none recently   · Exposures:  Denied to all below recently · Outdoor/Hiking  · Animal/Pet:   · Construction:  · Hot Tub:  · Brackish/stagnant exposure  · TB exposure:   · Sick Contacts: Allergies: Allergies   Allergen Reactions    Sustiva [Efavirenz] Vertigo     nightmares         Review of Systems:     10 point ROS obtained and + per HPI, all others negative     Medications:    Current Outpatient Prescriptions:     PREZISTA 800 mg tablet, Take 1 Tab by mouth daily (with breakfast). , Disp: 30 Tab, Rfl: 1    ritonavir (NORVIR) 100 mg capsule, Take 1 Cap by mouth daily (with breakfast). , Disp: 30 Cap, Rfl: 1    dolutegravir (TIVICAY) 50 mg tab tablet, Take 1 Tab by mouth daily. , Disp: 30 Tab, Rfl: 1    rilpivirine (EDURANT) 25 mg tab tablet, Take 1 Tab by mouth daily (with breakfast). , Disp: 90 Tab, Rfl: 4    methylphenidate ER 54 mg 24 hr tab, Take 1 Tab (54 mg total) by mouth dailyIndications: Attention-Deficit Hyperactivity Disorder Earliest Fill Date: 3/6/18., Disp: 30 Tab, Rfl: 0    testosterone (ANDROGEL) 20.25 mg/1.25 gram (1.62 %) gel, Apply 2 Sprays to affected area daily. Max Daily Amount: 40.5 mg., Disp: 1 Bottle, Rfl: 5    vitamin E (AQUA GEMS) 400 unit capsule, Take  by mouth daily. , Disp: , Rfl:     metoprolol succinate (TOPROL-XL) 100 mg tablet, TAKE ONE TABLET BY MOUTH DAILY, Disp: 30 Tab, Rfl: 8    lisinopril (PRINIVIL, ZESTRIL) 40 mg tablet, TAKE ONE TABLET BY MOUTH DAILY, Disp: 30 Tab, Rfl: 10    fenofibrate nanocrystallized (TRICOR) 145 mg tablet, TAKE ONE TABLET BY MOUTH DAILY, Disp: 30 Tab, Rfl: 10    cholecalciferol (VITAMIN D3) 1,000 unit cap, Take  by mouth daily. , Disp: , Rfl:     sildenafil citrate (VIAGRA) 100 mg tablet, Take 1 Tab by mouth as needed. , Disp: 3 Tab, Rfl: 11    MULTIVITAMIN PO, Take  by mouth.  , Disp: , Rfl:     [START ON 4/3/2018] methylphenidate ER 54 mg 24 hr tab, Indications: Attention-Deficit Hyperactivity Disorder Earliest Fill Date: 4/3/18.   Take one tablet daily, Disp: 30 Tab, Rfl: 0    [START ON 5/1/2018] methylphenidate ER 54 mg 24 hr tab, Indications: Attention-Deficit Hyperactivity Disorder Earliest Fill Date: 5/1/18. Take one tablet daily, Disp: 30 Tab, Rfl: 0      Physical Exam:    · Vitals: reviewed   · GEN: NAD  · HEENT: Normocephalic, atraumatic, PERRL, no scleral icterus,  no cervical, no lymphadenopathy, no sinus tenderness, no thrush, dentition fair   · CV: S1, S2 heard regularly, + cyst like lesion palpated in L chest/breast area   · Lungs: Clear to auscultation bilaterally  · Abdomen: soft, obese,  non tender, no CVA tenderness   · Extremities: no edema  · Neuro: Alert, oriented to time, place and situation, moves all extremities to commands, verbal   · Skin: no rash  · Psych: good affect, good eye contact, non tearful         Labs:     1/2017  CD4 730  VL < 20    4/2013   G6PD 246    4/2016   Component Value Flag Ref Range Units Status     Hepatitis A Ab, IgM Negative  Negative  Final     Hep A Ab, total Negative  Negative  Final     Hep B surface Ag screen Negative  Negative  Final     Hepatitis Be Antigen Negative  Negative  Final     Hep B Core Ab, IgM Negative  Negative  Final     Hep B Core Ab, total Positive (A) Negative  Final     Hepatitis Be Antibody Positive (A) Negative  Final     Anti-HBs >100   Index Value Final     Comment:     RPR 4/2013 Non reactive     Genosure 2013    NRTI   Lamivudine minimal response   TDF reduced response   Abacavir minimal response   Emcitribine resistant     NNRTI  Nevirapine resistant  Efavirenz resistant     PI: no resistance noted     Mr Damian Matthew is a  is a 62y.o. year old gentleman with long standing HIV ( dx 1985), MSM, HTN, hx of esophageal thrush, GERD and lipid abnormalities here for HIV care.       1) HIV: AIDS dx unknown   DX 1985  Risk factor MSM  Current ART: Prezista, Norvir, Edurant and Tivicay  Discussed with him that its important to take norvir to boost prezista  Will check labs today and if VL increased, would be as he has not been taking norvir to boost prezista  Will re assess based on results   Compliance, safe sex discussed in depth today        2) Health Maintenance:        Screening:  STI screening ordered today        Anal pap smear screening: Counseled and discussed about HPV and anal worts, and anal cancer. His  has had anal warts per discussion. Will do anal pap next visit per our discussion. Gave him resources ANCHOR study etc to read before anal pap next visit      Osteoporosis screening with DEXA: recommended       Immunizations:reviewd and discussed, recommend meningitis vaccine, side effects explained, next dose in 2 months     Other health Maintenance per PCP    Vitamin D , check levels and supplement if low     Lipids: yearly wt PCP         2) Hx of breast/chest mass ( L):  S/P Mammogram 2015  Result read \" Negative. No evidence of malignancy.  Recommend  clinical followup\"    3) HTN    4) hx of GERD: says he no longer has this problem and does not take PPI  D/W that PPI interact wt Edurant and does not get good absorption     5) RTC 4 months, if labs indicate otherwise, will need closer follow up

## 2018-03-20 NOTE — PATIENT INSTRUCTIONS
Vaccine Information Statement    Meningococcal ACWY Vaccines--MenACWY and MPSV4: What You Need to Know    Many Vaccine Information Statements are available in American and other languages. See www.immunize.org/vis. Hojas de Información Sobre Vacunas están disponibles en español y en muchos otros idiomas. Visite John.si. 1. Why get vaccinated? Meningococcal disease is a serious illness caused by a type of bacteria called Neisseria meningitidis. It can lead to meningitis (infection of the lining of the brain and spinal cord) and infections of the blood. Meningococcal disease often occurs without warning - even among people who are otherwise healthy. Meningococcal disease can spread from person to person through close contact (coughing or kissing) or lengthy contact, especially among people living in the same household. There are at least 12 types of N. meningitidis, called serogroups.   Serogroups A, B, C, W, and Y cause most meningococcal disease. Anyone can get meningococcal disease but certain people are at increased risk, including:   Infants younger than one year old    Adolescents and young adults 12 through 21years old  [address] with certain medical conditions that affect the immune system   Microbiologists who routinely work with isolates of N. meningitidis   People at risk because of an outbreak in their community    Even when it is treated, meningococcal disease kills 10 to 15 infected people out of 100. And of those who survive, about 10 to 20 out of every 100 will suffer disabilities such as hearing loss, brain damage, kidney damage, amputations, nervous system problems, or severe scars from skin grafts. Meningococcal ACWY vaccines can help prevent meningococcal disease caused by serogroups A, C, W, and Y.   A different meningococcal vaccine is available to help protect against serogroup B.    2. Meningococcal ACWY Vaccines    There are two kinds of meningococcal vaccines licensed by the Food and Drug Administration (FDA) for protection against serogroups A, C, W, and Y: meningococcal conjugate vaccine (MenACWY) and meningococcal polysaccharide vaccine (MPSV4). Two doses of MenACWY are routinely recommended for adolescents 6 through 25years old: the first dose at 6or 15years old, with a booster dose at age 12. Some adolescents, including those with HIV, should get additional doses. Ask your health care provider for more information. In addition to routine vaccination for adolescents, MenACWY vaccine is also recommended for certain groups of people:   People at risk because of a serogroup A, C, W, or Y meningococcal disease outbreak   Anyone whose spleen is damaged or has been removed    Anyone with a rare immune system condition called persistent complement component deficiency   Anyone taking a drug called eculizumab (also called Soliris®)   Microbiologists who routinely work with isolates of N. meningitidis    Anyone traveling to, or living in, a part of the world where meningococcal disease is common, such as parts [address] freshmen living in [place] [address] recruits    Children between 2 and 21 months old, and people with certain medical conditions need multiple doses for adequate protection. Ask your health care provider about the number and timing of doses, and the need for booster doses. MenACWY is the preferred vaccine for people in these groups who are 2 months through 54years old, have received MenACWY previously, or anticipate requiring multiple doses. MPSV4 is recommended for adults older than 55 who anticipate requiring only a single dose (travelers, or during community outbreaks). 3. Some people should not get this vaccine    Tell the person who is giving you the vaccine:     If you have any severe, life-threatening allergies.     If you have ever had a life-threatening allergic reaction after a previous dose of meningococcal ACWY vaccine, or if you have a severe allergy to any part of this vaccine, you should not get this vaccine. Your provider can tell you about the vaccines ingredients.  If you are pregnant or breastfeeding. There is not very much information about the potential risks of this vaccine for a pregnant woman or breastfeeding mother. It should be used during pregnancy only if clearly needed. If you have a mild illness, such as a cold, you can probably get the vaccine today. If you are moderately or severely ill, you should probably wait until you recover. Your doctor can advise you. 4. Risks of a vaccine reaction    With any medicine, including vaccines, there is a chance of side effects. These are usually mild and go away on their own within a few days, but serious reactions are also possible. As many as half of the people who get meningococcal ACWY vaccine have mild problems following vaccination, such as redness or soreness where the shot was given. If these problems occur, they usually last for 1 or 2 days. They are more common after MenACWY than after MPSV4. A small percentage of people who receive the vaccine develop a mild fever. Problems that could happen after any injected vaccine:     People sometimes faint after a medical procedure, including vaccination. Sitting or lying down for about 15 minutes can help prevent fainting, and injuries caused by a fall. Tell your doctor if you feel dizzy, or have vision changes or ringing in the ears.  Some people get severe pain in the shoulder and have difficulty moving the arm where a shot was given. This happens very rarely.  Any medication can cause a severe allergic reaction. Such reactions from a vaccine are very rare, estimated at about 1 in a million doses, and would happen within a few minutes to a few hours after the vaccination.     As with any medicine, there is a very remote chance of a vaccine causing a serious injury or death. The safety of vaccines is always being monitored. For more information, visit: www.cdc.gov/vaccinesafety/    5. What if there is a serious reaction? What should I look for?  Look for anything that concerns you, such as signs of a severe allergic reaction, very high fever, or unusual behavior. Signs of a severe allergic reaction can include hives, swelling of the face and throat, difficulty breathing, a fast heartbeat, dizziness, and weakness - usually within a few minutes to a few hours after the vaccination. What should I do?  If you think it is a severe allergic reaction or other emergency that cant wait, call 9-1-1 and get to the nearest hospital. Otherwise, call your doctor.  Afterward, the reaction should be reported to the Vaccine Adverse Event Reporting System (VAERS). Your doctor should file this report, or you can do it yourself through the VAERS web site at www.vaers. hhs.gov, or by calling 7-894.152.9169. VAERS does not give medical advice. 6. The National Vaccine Injury Compensation Program    The [facility] Vaccine Injury Compensation Program (VICP) is a federal program that was created to compensate people who may have been injured by certain vaccines. Persons who believe they may have been injured by a vaccine can learn about the program and about filing a claim by calling 2-900.541.8098 or visiting the [facility] [facility] website at www.[facility].gov/vaccinecompensation. There is a time limit to file a claim for compensation. 7. How can I learn more?  Ask your health care provider. He or she can give you the vaccine package insert or suggest other sources of information.  Call your local or state health department.    Contact the Centers for Disease Control and Prevention (CDC):  - Call 5-603.457.3171 (1-800-CDC-INFO) or  - Visit CDCs website at www.cdc.gov/vaccines    Vaccine Information Statement   Meningococcal ACWY Vaccines 03-  42 JOSETTE Donahue [id number]    Department of Health and Human Services  Centers for Disease Control and Prevention    Office Use Only

## 2018-03-20 NOTE — MR AVS SNAPSHOT
[facility] [address] 
[phone] Patient: Soila Edwards MRN: [id number] :[id number] Visit Information Date & Time Provider Department Dept. Phone Encounter #  
 3/20/2018 11:00 AM [address] at [address] [place] [address] [id number] Your Appointments 2018  8:15 AM  
ROUTINE CARE with K[facility]y [facility], [address] [place] [address],[address] [facility]) Appt Note: 6 mon f/u  
 [address] [id number]  
[address] [facility] [address] Upcoming Health Maintenance Date Due COLONOSCOPY 2021 Pneumococcal 19-64 Highest Risk (3 of 3 - PPSV23) 2022 DTaP/Tdap/Td series (2 - Td) 2026 Allergies as of 3/20/2018  Review Complete On: 3/20/2018 By: Kale Cobb DO Severity Noted Reaction Type Reactions Sustiva [Efavirenz]  11/15/2011    Vertigo  
 nightmares Current Immunizations  Reviewed on 2017 Name Date Influenza Vaccine 2017, 2016, 2014, 2013 Influenza Vaccine (Quad) PF 2017, 10/22/2015 Influenza Vaccine PF 2013 Meningococcal (MCV4O) Vaccine  Incomplete Pneumococcal Conjugate (PCV-13) 2015 Pneumococcal Conjugate (PCV-7) 2013 Pneumococcal Polysaccharide (PPSV-23) 2017 Tdap 2016 10:35 AM  
  
 Not reviewed this visit You Were Diagnosed With   
  
 Codes Comments HIV (human immunodeficiency virus infection) ([facility]    -  Primary ICD-10-CM: B20 
ICD-9-CM: V08 Encounter for immunization     ICD-10-CM: Z61 ICD-9-CM: V03.89 Vitals BP Pulse Temp Resp Height(growth percentile) Weight(growth percentile) 126/82 (BP 1 Location: Left arm, BP Patient Position: Sitting) 68 96.9 °F (36.1 °C) (Oral) 18 6' 1\" (1.854 m) 239 lb 3.2 oz (108.5 kg) SpO2 BMI Smoking Status 98% 31.56 kg/m2 Former Smoker Vitals History BMI and BSA Data Body Mass Index Body Surface Area  
 31.56 kg/m 2 2.36 m 2 Preferred Pharmacy Pharmacy Name Phone [facility] [address] [place] [address] [phone] Your Updated Medication List  
  
   
This list is accurate as of 3/20/18 11:25 AM.  Always use your most recent med list.  
  
  
  
  
 atorvastatin 40 mg tablet Commonly known as:  LIPITOR Take  by mouth daily. EDURANT 25 mg Tab tablet Generic drug:  rilpivirine TAKE ONE TABLET BY MOUTH DAILY  
  
 fenofibrate nanocrystallized 145 mg tablet Commonly known as:  TRICOR  
TAKE ONE TABLET BY MOUTH DAILY  
  
 lisinopril 40 mg tablet Commonly known as:  PRINIVIL, ZESTRIL  
TAKE ONE TABLET BY MOUTH DAILY * methylphenidate HCl 54 mg CR tablet Commonly known as:  CONCERTA Take 1 Tab (54 mg total) by mouth dailyIndications: Attention-Deficit Hyperactivity Disorder Earliest Fill Date: 3/6/18. * methylphenidate HCl 54 mg CR tablet Commonly known as:  CONCERTA Indications: Attention-Deficit Hyperactivity Disorder Earliest Fill Date: 4/3/18. Take one tablet daily Start taking on:  4/3/2018 * methylphenidate HCl 54 mg CR tablet Commonly known as:  CONCERTA Indications: Attention-Deficit Hyperactivity Disorder Earliest Fill Date: 5/1/18. Take one tablet daily Start taking on:  5/1/2018  
  
 metoprolol succinate 100 mg tablet Commonly known as:  TOPROL-XL  
TAKE ONE TABLET BY MOUTH DAILY MULTIVITAMIN PO Take  by mouth. PREZISTA 800 mg tablet Generic drug:  darunavir ethanolate Take 1 Tab by mouth daily (with breakfast). ritonavir 100 mg capsule Commonly known as:  Kathy Tiradoy Take 1 Cap by mouth two (2) times a day. sildenafil citrate 100 mg tablet Commonly known as:  VIAGRA Take 1 Tab by mouth as needed. testosterone 20.25 mg/1.25 gram (1.62 %) gel Commonly known as:  ANDROGEL Apply 2 Sprays to affected area daily. Max Daily Amount: 40.5 mg.  
  
 TIVICAY 50 mg Tab tablet Generic drug:  dolutegravir TAKE ONE TABLET BY MOUTH DAILY  
  
 VITAMIN D3 1,000 unit Cap Generic drug:  cholecalciferol Take  by mouth daily. vitamin E 400 unit capsule Commonly known as:  [address] Take  by mouth daily. * Notice: This list has 3 medication(s) that are the same as other medications prescribed for you. Read the directions carefully, and ask your doctor or other care provider to review them with you. We Performed the Following CBC WITH AUTOMATED DIFF [10638 CPT(R)] CHLAMYDIA/GC PCR [51015 CPT(R)] GENOSURE PRIME(SM) [[id number] Custom] HEP B SURFACE AB [id number] CPT(R)] HEP B SURFACE AG [id number] CPT(R)] HEPATITIS A AB, IGM & TOTAL [[id number] Custom] HEPATITIS B CORE AB, TOTAL [id number] CPT(R)] HEPATITIS C AB [37827 CPT(R)] HIV-1 RNA QT BY PCR [43194 CPT(R)] LYMPHOCYTES, CD4 PERCENT AND ABSOLUTE [89347 CPT(R)] MENINGOCOCCAL (MENVEO) CONJUGATE VACCINE, SEROGROUPS A, C, Y AND W-135 (TETRAVALENT), IM [id number] CPT(R)] METABOLIC PANEL, COMPREHENSIVE [13115 CPT(R)] QUANTIFERON TB GOLD [[id number] Custom] RPR [26227 CPT(R)] TOXOPLASMA GONDII AB, IGG [id number] CPT(R)] VITAMIN D, 25 HYDROXY [id number] CPT(R)] To-Do List   
 03/20/2018 Imaging:  DEXA BONE DENSITY STUDY AXIAL Patient Instructions Vaccine Information Statement Meningococcal ACWY VaccinesMenACWY and MPSV4: What You Need to Know Many Vaccine Information Statements are available in [place] and other languages. See www.immunize.org/vis. Hojas de Información Sobre Vacunas están disponibles en español y en muchos otros idiomas. Visite WorthScale.si. 1. Why get vaccinated?  
 
Meningococcal disease is a serious illness caused by a type of bacteria called Neisseria meningitidis. It can lead to meningitis (infection of the lining of the brain and spinal cord) and infections of the blood. Meningococcal disease often occurs without warning  even among people who are otherwise healthy. Meningococcal disease can spread from person to person through close contact (coughing or kissing) or lengthy contact, especially among people living in the same household. There are at least 12 types of N. meningitidis, called serogroups.   Serogroups A, B, C, W, and Y cause most meningococcal disease. Anyone can get meningococcal disease but certain people are at increased risk, including:  Infants younger than one year old  Adolescents and young adults 12 through 21years old  People with certain medical conditions that affect the immune system  Microbiologists who routinely work with isolates of N. meningitidis  People at risk because of an outbreak in their community Even when it is treated, meningococcal disease kills 10 to 15 infected people out of 100. And of those who survive, about 10 to 20 out of every 100 will suffer disabilities such as hearing loss, brain damage, kidney damage, amputations, nervous system problems, or severe scars from skin grafts. Meningococcal ACWY vaccines can help prevent meningococcal disease caused by serogroups A, C, W, and Y. A different meningococcal vaccine is available to help protect against serogroup B. 
 
2. Meningococcal ACWY Vaccines There are two kinds of meningococcal vaccines licensed by the Food and Drug Administration (FDA) for protection against serogroups A, C, W, and Y: meningococcal conjugate vaccine (MenACWY) and meningococcal polysaccharide vaccine (MPSV4). Two doses of MenACWY are routinely recommended for adolescents 6 through 25years old: the first dose at 6or 15years old, with a booster dose at age 12.   Some adolescents, including those with HIV, should get additional doses. Ask your health care provider for more information. In addition to routine vaccination for adolescents, MenACWY vaccine is also recommended for certain groups of people:  People at risk because of a serogroup A, C, W, or Y meningococcal disease outbreak  Anyone whose spleen is damaged or has been removed  Anyone with a rare immune system condition called persistent complement component deficiency  Anyone taking a drug called eculizumab (also called Soliris®)  Microbiologists who routinely work with isolates of N. meningitidis  Anyone traveling to, or living in, a part of the world where meningococcal disease is common, such as parts of [employer] freshmen living in dormitories [place] [address] recruits Children between 2 and 22 months old, and people with certain medical conditions need multiple doses for adequate protection. Ask your health care provider about the number and timing of doses, and the need for booster doses. MenACWY is the preferred vaccine for people in these groups who are 2 months through 54years old, have received MenACWY previously, or anticipate requiring multiple doses. MPSV4 is recommended for adults older than 55 who anticipate requiring only a single dose (travelers, or during community outbreaks). 3. Some people should not get this vaccine Tell the person who is giving you the vaccine:  If you have any severe, life-threatening allergies. If you have ever had a life-threatening allergic reaction after a previous dose of meningococcal ACWY vaccine, or if you have a severe allergy to any part of this vaccine, you should not get this vaccine. Your provider can tell you about the vaccines ingredients.  If you are pregnant or breastfeeding. There is not very much information about the potential risks of this vaccine for a pregnant woman or breastfeeding mother. It should be used during pregnancy only if clearly needed. If you have a mild illness, such as a cold, you can probably get the vaccine today. If you are moderately or severely ill, you should probably wait until you recover. Your doctor can advise you. 4. Risks of a vaccine reaction With any medicine, including vaccines, there is a chance of side effects. These are usually mild and go away on their own within a few days, but serious reactions are also possible. As many as half of the people who get meningococcal ACWY vaccine have mild problems following vaccination, such as redness or soreness where the shot was given. If these problems occur, they usually last for 1 or 2 days. They are more common after MenACWY than after MPSV4. A small percentage of people who receive the vaccine develop a mild fever. Problems that could happen after any injected vaccine:  People sometimes faint after a medical procedure, including vaccination. Sitting or lying down for about 15 minutes can help prevent fainting, and injuries caused by a fall. Tell your doctor if you feel dizzy, or have vision changes or ringing in the ears.  Some people get severe pain in the shoulder and have difficulty moving the arm where a shot was given. This happens very rarely.  Any medication can cause a severe allergic reaction. Such reactions from a vaccine are very rare, estimated at about 1 in a million doses, and would happen within a few minutes to a few hours after the vaccination. As with any medicine, there is a very remote chance of a vaccine causing a serious injury or death. The safety of vaccines is always being monitored. For more information, visit: www.cdc.gov/vaccinesafety/ 
 
5. What if there is a serious reaction? What should I look for?  Look for anything that concerns you, such as signs of a severe allergic reaction, very high fever, or unusual behavior.  
 
Signs of a severe allergic reaction can include hives, swelling of the face and throat, difficulty breathing, a fast heartbeat, dizziness, and weakness  usually within a few minutes to a few hours after the vaccination. What should I do?  If you think it is a severe allergic reaction or other emergency that cant wait, call 9-1-1 and get to the nearest hospital. Otherwise, call your doctor.  Afterward, the reaction should be reported to the Vaccine Adverse Event Reporting System (VAERS). Your doctor should file this report, or you can do it yourself through the VAERS web site at www.vaers. [facility].gov, or by calling 6-721.729.5579. VAERS does not give medical advice. 6. The National Vaccine Injury Compensation Program 
 
The [facility] Vaccine Injury Compensation Program (VICP) is a federal program that was created to compensate people who may have been injured by certain vaccines. Persons who believe they may have been injured by a vaccine can learn about the program and about filing a claim by calling 9-685.119.5388 or visiting the [address] [employer] website at www.[facility].gov/vaccinecompensation. There is a time limit to file a claim for compensation. 7. How can I learn more?  Ask your health care provider. He or she can give you the vaccine package insert or suggest other sources of information.  Call your local or state health department.  Contact the Centers for Disease Control and Prevention (CDC): 
- Call 5-314.729.7431 (1-800-CDC-INFO) or 
- Visit CDCs website at www.cdc.gov/vaccines Vaccine Information Statement Meningococcal ACWY Vaccines 03- 
42 JOSETTE Jade Carlos [id number] Department of Health and [employer] Centers for Disease Control and Prevention Office Use Only Introducing [facility] & HE[facility]! Dear Gama Diego: Thank you for requesting a [facility] account. Our records indicate that you already have an active [facility] account. You can access your account anytime at [email or web address] [facility]`[email or web address] Did you know that you can access your hospital and ER discharge instructions at any time in [employer]? You can also review all of your test results from your hospital stay or ER visit. Additional Information If you have questions, please visit the Frequently Asked Questions section of the [employer] website at [email or web address] Remember, [facility] is NOT to be used for urgent needs. For medical emergencies, dial 911. Now available from your iPhone and Android! Please provide this summary of care documentation to your next provider. Your primary care clinician is listed as Geronimo ARGUETA. If you have any questions after today's visit, please call [phone].

## 2018-03-23 ENCOUNTER — TELEPHONE (OUTPATIENT)
Dept: FAMILY MEDICINE CLINIC | Age: 58
End: 2018-03-23

## 2018-03-23 LAB
25(OH)D3+25(OH)D2 SERPL-MCNC: 27.3 NG/ML (ref 30–100)
ALBUMIN SERPL-MCNC: 4.1 G/DL (ref 3.5–5.5)
ALBUMIN/GLOB SERPL: 1.5 {RATIO} (ref 1.2–2.2)
ALP SERPL-CCNC: 51 IU/L (ref 39–117)
ALT SERPL-CCNC: 32 IU/L (ref 0–44)
ANNOTATION COMMENT IMP: NORMAL
AST SERPL-CCNC: 34 IU/L (ref 0–40)
BASOPHILS # BLD AUTO: 0 X10E3/UL (ref 0–0.2)
BASOPHILS NFR BLD AUTO: 1 %
BILIRUB SERPL-MCNC: 0.3 MG/DL (ref 0–1.2)
BUN SERPL-MCNC: 17 MG/DL (ref 6–24)
BUN/CREAT SERPL: 18 (ref 9–20)
C TRACH RRNA SPEC QL NAA+PROBE: NEGATIVE
CALCIUM SERPL-MCNC: 9.3 MG/DL (ref 8.7–10.2)
CD3+CD4+ CELLS # BLD: 554 /UL (ref 359–1519)
CD3+CD4+ CELLS NFR BLD: 26.4 % (ref 30.8–58.5)
CHLORIDE SERPL-SCNC: 104 MMOL/L (ref 96–106)
CO2 SERPL-SCNC: 26 MMOL/L (ref 18–29)
CREAT SERPL-MCNC: 0.94 MG/DL (ref 0.76–1.27)
EOSINOPHIL # BLD AUTO: 0.3 X10E3/UL (ref 0–0.4)
EOSINOPHIL NFR BLD AUTO: 4 %
ERYTHROCYTE [DISTWIDTH] IN BLOOD BY AUTOMATED COUNT: 14.4 % (ref 12.3–15.4)
GAMMA INTERFERON BACKGROUND BLD IA-ACNC: 0.03 IU/ML
GENE ANALYSIS NARR RPT DOC: NORMAL
GFR SERPLBLD CREATININE-BSD FMLA CKD-EPI: 104 ML/MIN/1.73
GFR SERPLBLD CREATININE-BSD FMLA CKD-EPI: 90 ML/MIN/1.73
GLOBULIN SER CALC-MCNC: 2.8 G/DL (ref 1.5–4.5)
GLUCOSE SERPL-MCNC: 92 MG/DL (ref 65–99)
HAV AB SER QL IA: POSITIVE
HAV IGM SERPL QL IA: NEGATIVE
HBV CORE AB SERPL QL IA: POSITIVE
HBV SURFACE AB SER QL: REACTIVE
HBV SURFACE AG SERPL QL IA: NEGATIVE
HCT VFR BLD AUTO: 43.1 % (ref 37.5–51)
HCV AB S/CO SERPL IA: <0.1 S/CO RATIO (ref 0–0.9)
HGB BLD-MCNC: 14 G/DL (ref 13–17.7)
HIV GENOSURE PRIME(R): NORMAL
HIV SUSC PNL ISLT GENOTYP: NORMAL
HIV1 RNA # SERPL NAA+PROBE: 70 COPIES/ML
HIV1 RNA SERPL NAA+PROBE-LOG#: 1.84 LOG10COPY/ML
IMM GRANULOCYTES # BLD: 0 X10E3/UL (ref 0–0.1)
IMM GRANULOCYTES NFR BLD: 0 %
LYMPHOCYTES # BLD AUTO: 2.1 X10E3/UL (ref 0.7–3.1)
LYMPHOCYTES NFR BLD AUTO: 30 %
M TB IFN-G BLD-IMP: NEGATIVE
M TB IFN-G CD4+ BCKGRND COR BLD-ACNC: 0 IU/ML
M TB IFN-G CD4+ T-CELLS BLD-ACNC: 0.03 IU/ML
MCH RBC QN AUTO: 28.5 PG (ref 26.6–33)
MCHC RBC AUTO-ENTMCNC: 32.5 G/DL (ref 31.5–35.7)
MCV RBC AUTO: 88 FL (ref 79–97)
MITOGEN IGNF BLD-ACNC: 7.66 IU/ML
MONOCYTES # BLD AUTO: 0.7 X10E3/UL (ref 0.1–0.9)
MONOCYTES NFR BLD AUTO: 10 %
N GONORRHOEA RRNA SPEC QL NAA+PROBE: NEGATIVE
NEUTROPHILS # BLD AUTO: 3.8 X10E3/UL (ref 1.4–7)
NEUTROPHILS NFR BLD AUTO: 55 %
PLATELET # BLD AUTO: 250 X10E3/UL (ref 150–379)
POTASSIUM SERPL-SCNC: 4.6 MMOL/L (ref 3.5–5.2)
PROT SERPL-MCNC: 6.9 G/DL (ref 6–8.5)
QUANTIFERON INCUBATION: NORMAL
RBC # BLD AUTO: 4.92 X10E6/UL (ref 4.14–5.8)
RPR SER QL: NON REACTIVE
SERVICE CMNT-IMP: NORMAL
SODIUM SERPL-SCNC: 142 MMOL/L (ref 134–144)
T GONDII IGG SERPL IA-ACNC: <3 IU/ML (ref 0–7.1)
WBC # BLD AUTO: 7 X10E3/UL (ref 3.4–10.8)

## 2018-03-23 NOTE — TELEPHONE ENCOUNTER
Called Mr Juanito Cortes today and discussed blood work results. VL increased to 80. Possible due to lack of boosting effect of norvir as he was taking prezista without norvir. He was told to take norvir wt Prezista and we will check labs next visit. Vitamin D supplementation emphasized today     Hep B core + . Will request Hep B DNA to be added on to labs.

## 2018-03-27 LAB
HBV DNA SERPL NAA+PROBE-ACNC: NORMAL IU/ML
HBV DNA SERPL NAA+PROBE-LOG IU: NORMAL LOG10IU/ML
REF LAB TEST REF RANGE: NORMAL
SPECIMEN STATUS REPORT, ROLRST: NORMAL

## 2018-04-09 ENCOUNTER — TELEPHONE (OUTPATIENT)
Dept: FAMILY MEDICINE CLINIC | Age: 58
End: 2018-04-09

## 2018-04-09 NOTE — TELEPHONE ENCOUNTER
I called and spoke with Melchor Kehr with [facility]. She states that patient has not filled Prezista since 2017 and has 2 fills on file.

## 2018-04-09 NOTE — TELEPHONE ENCOUNTER
I attempted to contact patient. No answer. I left vm to return call. **Prezista was last sent to pharmacy 3/20/18 with 1 refill. Pt has not picked up rx yet. Pt has 2 fills of prezista at pharmacy.

## 2018-04-09 NOTE — TELEPHONE ENCOUNTER
----- Message from Renetta Carmen sent at 4/9/2018  1:58 PM EDT -----  Regarding: Dr. Demetris Atwood refill  Patient needs a refill of his Rx, Prezista. [address] [place] [address] on file. Best contact number is [phone].

## 2018-04-10 NOTE — TELEPHONE ENCOUNTER
Received call from patient. Pt was informed [facility] still has 2 fills of his Prezista. He states that he will call pharmacy.

## 2018-04-30 NOTE — PROGRESS NOTES
HISTORY OF PRESENT ILLNESS  Paulo Martinez is a 62 y.o. male. HPI   F/u HTN dyslipidemia, ADD GERD, low testosterone  Has established with ID at [facility] Dr Augustin Kinds increased to [de-identified], normal CD4, medicines adjusted per ID MD--norvir recommended with prezista  dexa scan recommended by ID MD    Last refill of ritalin on 3-7-18.  reviewed    Last visit:  Takes ritalin 1/2 tab every day-needs refill  Pt not taking hctz from what he recalls  Signed up for exercise program again with --light weights and CV exercises in his house  Overall the sciatica sxs are better since training,swimming  alleve helps the pain  Pt hopes to get off high blood pressure medicines at some point  Working in [employer]--takes classes at [employer]  riltalin helps in class and with reading per pt  Has restarted testosterone gel  Has not yet established with another ID MD-last HIV RNA level was undetectable. Has been referred to see new ID MD at [facility]    Patient Active Problem List    Diagnosis Date Noted    LFT elevation 01/30/2015    HTN (hypertension) 09/17/2013    Low serum testosterone level 09/17/2013    Dyslipidemia 09/17/2013    Human immunodeficiency virus (HIV) disease ([facility] [address] 01/25/2013    ADD (attention deficit disorder) 01/25/2013     Current Outpatient Prescriptions   Medication Sig Dispense Refill    PREZISTA 800 mg tablet Take 1 Tab by mouth daily (with breakfast). 30 Tab 1    ritonavir (NORVIR) 100 mg capsule Take 1 Cap by mouth daily (with breakfast). 30 Cap 1    dolutegravir (TIVICAY) 50 mg tab tablet Take 1 Tab by mouth daily. 30 Tab 1    rilpivirine (EDURANT) 25 mg tab tablet Take 1 Tab by mouth daily (with breakfast).  90 Tab 4    methylphenidate ER 54 mg 24 hr tab Take 1 Tab (54 mg total) by mouth dailyIndications: Attention-Deficit Hyperactivity Disorder Earliest Fill Date: 3/6/18. 30 Tab 0    methylphenidate ER 54 mg 24 hr tab Indications: Attention-Deficit Hyperactivity Disorder Earliest Fill Date: 4/3/18. Take one tablet daily 30 Tab 0    [START ON 5/1/2018] methylphenidate ER 54 mg 24 hr tab Indications: Attention-Deficit Hyperactivity Disorder Earliest Fill Date: 5/1/18. Take one tablet daily 30 Tab 0    testosterone (ANDROGEL) 20.25 mg/1.25 gram (1.62 %) gel Apply 2 Sprays to affected area daily. Max Daily Amount: 40.5 mg. 1 Bottle 5    vitamin E (AQUA GEMS) 400 unit capsule Take  by mouth daily.  metoprolol succinate (TOPROL-XL) 100 mg tablet TAKE ONE TABLET BY MOUTH DAILY 30 Tab 8    lisinopril (PRINIVIL, ZESTRIL) 40 mg tablet TAKE ONE TABLET BY MOUTH DAILY 30 Tab 10    fenofibrate nanocrystallized (TRICOR) 145 mg tablet TAKE ONE TABLET BY MOUTH DAILY 30 Tab 10    cholecalciferol (VITAMIN D3) 1,000 unit cap Take  by mouth daily.  sildenafil citrate (VIAGRA) 100 mg tablet Take 1 Tab by mouth as needed. 3 Tab 11    MULTIVITAMIN PO Take  by mouth.          Allergies   Allergen Reactions    Sustiva [Efavirenz] Vertigo     nightmares      Lab Results  Component Value Date/Time   Glucose 92 03/20/2018 11:55 AM   LDL, calculated 121 (H) 11/01/2017 11:03 AM   Creatinine 0.94 03/20/2018 11:55 AM      Lab Results  Component Value Date/Time   Cholesterol, total 200 (H) 11/01/2017 11:03 AM   HDL Cholesterol 42 11/01/2017 11:03 AM   LDL, calculated 121 (H) 11/01/2017 11:03 AM   Triglyceride 187 (H) 11/01/2017 11:03 AM   CHOL/HDL Ratio 6.6 (H) 01/06/2010 11:47 AM     Lab Results  Component Value Date/Time   GFR est non-AA 90 03/20/2018 11:55 AM   GFR est  03/20/2018 11:55 AM   Creatinine 0.94 03/20/2018 11:55 AM   BUN 17 03/20/2018 11:55 AM   Sodium 142 03/20/2018 11:55 AM   Potassium 4.6 03/20/2018 11:55 AM   Chloride 104 03/20/2018 11:55 AM   CO2 26 03/20/2018 11:55 AM     Lab Results  Component Value Date/Time   Prostate Specific Ag 0.6 09/07/2017 10:45 AM   Prostate Specific Ag 0.5 08/18/2016 10:50 AM   Prostate Specific Ag 0.5 07/07/2015 08:40 AM        ROS    Physical Exam Constitutional: He appears well-developed and well-nourished. No distress. Appears stated age   HENT:   Head: Normocephalic. Cardiovascular: Normal rate, regular rhythm and normal heart sounds. Exam reveals no gallop and no friction rub. No murmur heard. Pulmonary/Chest: Effort normal and breath sounds normal.   Abdominal: Soft. Musculoskeletal: He exhibits no edema. Neurological: He is alert. Psychiatric: He has a normal mood and affect. Nursing note and vitals reviewed.       ASSESSMENT and PLAN  {ASSESSMENT/PLAN:[id number]}

## 2018-05-01 ENCOUNTER — OFFICE VISIT (OUTPATIENT)
Dept: INTERNAL MEDICINE CLINIC | Age: 58
End: 2018-05-01

## 2018-05-01 DIAGNOSIS — F98.8 ATTENTION DEFICIT DISORDER (ADD) WITHOUT HYPERACTIVITY: ICD-10-CM

## 2018-05-01 DIAGNOSIS — Z13.820 SCREENING FOR OSTEOPOROSIS: ICD-10-CM

## 2018-05-01 DIAGNOSIS — I10 ESSENTIAL HYPERTENSION: Primary | ICD-10-CM

## 2018-05-01 DIAGNOSIS — E78.5 DYSLIPIDEMIA: ICD-10-CM

## 2018-06-01 ENCOUNTER — TELEPHONE (OUTPATIENT)
Dept: FAMILY MEDICINE CLINIC | Age: 58
End: 2018-06-01

## 2018-06-01 NOTE — TELEPHONE ENCOUNTER
Called and spoke with patient. Pt was informed that he is overdue for his second Menveo vaccine. He was informed that he can come into the office any Tuesday or Thursday to have this done. Pt verbalized understanding.

## 2018-06-19 ENCOUNTER — CLINICAL SUPPORT (OUTPATIENT)
Dept: FAMILY MEDICINE CLINIC | Age: 58
End: 2018-06-19

## 2018-06-19 DIAGNOSIS — Z23 ENCOUNTER FOR IMMUNIZATION: Primary | ICD-10-CM

## 2018-06-25 DIAGNOSIS — B20 AIDS (ACQUIRED IMMUNE DEFICIENCY SYNDROME) (HCC): ICD-10-CM

## 2018-06-26 RX ORDER — RITONAVIR 100 MG/1
TABLET, FILM COATED ORAL
Qty: 30 TAB | Refills: 0 | Status: SHIPPED | OUTPATIENT
Start: 2018-06-26 | End: 2018-08-01 | Stop reason: SDUPTHER

## 2018-08-01 DIAGNOSIS — B20 AIDS (ACQUIRED IMMUNE DEFICIENCY SYNDROME) (HCC): ICD-10-CM

## 2018-08-07 RX ORDER — RITONAVIR 100 MG/1
TABLET ORAL
Qty: 30 TAB | Refills: 0 | Status: SHIPPED | OUTPATIENT
Start: 2018-08-07 | End: 2019-05-07

## 2018-08-09 DIAGNOSIS — E78.00 HYPERCHOLESTEREMIA: ICD-10-CM

## 2018-08-09 DIAGNOSIS — I10 ESSENTIAL HYPERTENSION, MALIGNANT: Primary | ICD-10-CM

## 2018-08-31 ENCOUNTER — OFFICE VISIT (OUTPATIENT)
Dept: INTERNAL MEDICINE CLINIC | Age: 58
End: 2018-08-31

## 2018-08-31 VITALS
WEIGHT: 242 LBS | HEART RATE: 64 BPM | OXYGEN SATURATION: 96 % | SYSTOLIC BLOOD PRESSURE: 149 MMHG | TEMPERATURE: 97.9 F | HEIGHT: 73 IN | BODY MASS INDEX: 32.07 KG/M2 | DIASTOLIC BLOOD PRESSURE: 80 MMHG

## 2018-08-31 DIAGNOSIS — F98.8 ATTENTION DEFICIT DISORDER, UNSPECIFIED HYPERACTIVITY PRESENCE: ICD-10-CM

## 2018-08-31 DIAGNOSIS — B20 HUMAN IMMUNODEFICIENCY VIRUS (HIV) DISEASE (HCC): ICD-10-CM

## 2018-08-31 DIAGNOSIS — I10 ESSENTIAL HYPERTENSION: Primary | ICD-10-CM

## 2018-08-31 DIAGNOSIS — E78.5 DYSLIPIDEMIA: ICD-10-CM

## 2018-08-31 DIAGNOSIS — Z12.5 PROSTATE CANCER SCREENING: ICD-10-CM

## 2018-08-31 DIAGNOSIS — E66.9 OBESITY (BMI 30.0-34.9): ICD-10-CM

## 2018-08-31 DIAGNOSIS — Z23 ENCOUNTER FOR IMMUNIZATION: ICD-10-CM

## 2018-08-31 RX ORDER — METHYLPHENIDATE HYDROCHLORIDE 54 MG/1
TABLET ORAL
Qty: 30 TAB | Refills: 0 | Status: SHIPPED | OUTPATIENT
Start: 2018-08-31 | End: 2018-08-31 | Stop reason: SDUPTHER

## 2018-08-31 RX ORDER — METHYLPHENIDATE HYDROCHLORIDE 54 MG/1
TABLET ORAL
Qty: 30 TAB | Refills: 0 | Status: SHIPPED | OUTPATIENT
Start: 2018-08-31 | End: 2019-02-06 | Stop reason: SDUPTHER

## 2018-08-31 NOTE — PROGRESS NOTES
HISTORY OF PRESENT ILLNESS Mary Ann oCsta is a 62 y.o. male. HPI  
 
F/u HTN dyslipidemia, ADD GERD, low testosterone In school though MAGED fagan now--CNA Wants to start on a Keto diet -will decrease carbs and sugar in diet per pt Last refill of ritalin on 8-13-18 Last OV Has established with ID at [facility] Dr Keon Long increased to [de-identified], normal CD4, medicines adjusted per ID MD--norvir recommended with prezista 
dexa scan recommended by ID MD 
  
Last refill of ritalin on 3-7-18.  reviewed 
  
Last visit: 
Takes ritalin 1/2 tab every day-needs refill Pt not taking hctz from what he recalls Signed up for exercise program again with --light weights and CV exercises in his house Overall the sciatica sxs are better since training,swimming 
alleve helps the pain Pt hopes to get off high blood pressure medicines at some point Working in [employer]--takes classes at [employer] helps in class and with reading per pt Has restarted testosterone gel Has not yet established with another ID MD-last HIV RNA level was undetectable. Has been referred to see new ID MD at [facility] 
  
 
Patient Active Problem List  
 Diagnosis Date Noted  LFT elevation 01/30/2015  
 HTN (hypertension) 09/17/2013  Low serum testosterone level 09/17/2013  Dyslipidemia 09/17/2013  Human immunodeficiency virus (HIV) disease ([facility] [address] 01/25/2013  ADD (attention deficit disorder) 01/25/2013 Current Outpatient Prescriptions Medication Sig Dispense Refill  methylphenidate ER 54 mg 24 hr tab Take 1 Tab (54 mg total) by mouth dailyIndications: Attention-Deficit Hyperactivity Disorder Earliest Fill Date: 8/9/18. 30 Tab 0  
 ritonavir (NORVIR) 100 mg tablet TAKE ONE TABLET BY MOUTH DAILY WITH BREAKFAST 30 Tab 0  
 PREZISTA 800 mg tablet Take 1 Tab by mouth daily (with breakfast). 30 Tab 1  
 dolutegravir (TIVICAY) 50 mg tab tablet Take 1 Tab by mouth daily.  30 Tab 1  
  rilpivirine (EDURANT) 25 mg tab tablet Take 1 Tab by mouth daily (with breakfast). 90 Tab 4  
 methylphenidate ER 54 mg 24 hr tab Indications: Attention-Deficit Hyperactivity Disorder Earliest Fill Date: 4/3/18. Take one tablet daily 30 Tab 0  
 methylphenidate ER 54 mg 24 hr tab Indications: Attention-Deficit Hyperactivity Disorder Earliest Fill Date: 5/1/18. Take one tablet daily 30 Tab 0  
 testosterone (ANDROGEL) 20.25 mg/1.25 gram (1.62 %) gel Apply 2 Sprays to affected area daily. Max Daily Amount: 40.5 mg. 1 Bottle 5  
 vitamin E (AQUA GEMS) 400 unit capsule Take  by mouth daily.  metoprolol succinate (TOPROL-XL) 100 mg tablet TAKE ONE TABLET BY MOUTH DAILY 30 Tab 8  
 lisinopril (PRINIVIL, ZESTRIL) 40 mg tablet TAKE ONE TABLET BY MOUTH DAILY 30 Tab 10  
 fenofibrate nanocrystallized (TRICOR) 145 mg tablet TAKE ONE TABLET BY MOUTH DAILY 30 Tab 10  
 cholecalciferol (VITAMIN D3) 1,000 unit cap Take  by mouth daily.  sildenafil citrate (VIAGRA) 100 mg tablet Take 1 Tab by mouth as needed. 3 Tab 11  
 MULTIVITAMIN PO Take  by mouth. Allergies Allergen Reactions  Sustiva [Efavirenz] Vertigo  
  nightmares Social History Substance Use Topics  Smoking status: Former Smoker  Smokeless tobacco: Never Used  Alcohol use 0.0 - 0.5 oz/week 0 - 1 Standard drinks or equivalent per week Lab Results Component Value Date/Time Glucose 92 03/20/2018 11:55 AM  
LDL, calculated 121 (H) 11/01/2017 11:03 AM  
Creatinine 0.94 03/20/2018 11:55 AM  
  
Lab Results Component Value Date/Time Cholesterol, total 200 (H) 11/01/2017 11:03 AM  
HDL Cholesterol 42 11/01/2017 11:03 AM  
LDL, calculated 121 (H) 11/01/2017 11:03 AM  
Triglyceride 187 (H) 11/01/2017 11:03 AM  
CHOL/HDL Ratio 6.6 (H) 01/06/2010 11:47 AM  
 
Lab Results Component Value Date/Time GFR est non-AA 90 03/20/2018 11:55 AM  
GFR est  03/20/2018 11:55 AM  
Creatinine 0.94 03/20/2018 11:55 AM  
 BUN 17 03/20/2018 11:55 AM  
Sodium 142 03/20/2018 11:55 AM  
Potassium 4.6 03/20/2018 11:55 AM  
Chloride 104 03/20/2018 11:55 AM  
CO2 26 03/20/2018 11:55 AM  
  
 
ROS Physical Exam  
Constitutional: He appears well-developed and well-nourished. No distress. Appears stated age, obese , nad HENT:  
Head: Normocephalic. Cardiovascular: Normal rate, regular rhythm and normal heart sounds. Exam reveals no gallop and no friction rub. No murmur heard. Pulmonary/Chest: Effort normal and breath sounds normal. No respiratory distress. He has no wheezes. He has no rales. He exhibits no tenderness. Abdominal: Soft. Musculoskeletal: He exhibits no edema. Neurological: He is alert. Psychiatric: He has a normal mood and affect. Nursing note and vitals reviewed. ASSESSMENT and PLAN Diagnoses and all orders for this visit: 1. Essential hypertension Reasonable control 2. Dyslipidemia -     LIPID PANEL 3. Prostate cancer screening -     PSA SCREENING (SCREENING) () 4. Encounter for immunization 
-     INFLUENZA VACCINE INACTIVATED (IIV), SUBUNIT, ADJUVANTED, IM 
 
5. Attention deficit disorder, unspecified hyperactivity presence 
-     methylphenidate ER 54 mg 24 hr tab; Indications: Attention-Deficit Hyperactivity Disorder. Take one tablet daily. May refill 12-13-18 Refill 10/13 and 12/13/18 Reviewed  6. Human immunodeficiency virus (HIV) disease ([facility] [address] -     [facility] Infectious Disease ref [id number] [place] 7. Obesity (BMI 30.0-34. 9) Keto diet should be ok--discussed Can check lipids in 1-2 months I have reviewed/discussed the above normal BMI with the patient. I have recommended the following interventions: dietary management education, guidance, and counseling and encourage exercise . Nancy Freeman Follow-up Disposition: Not on File

## 2018-08-31 NOTE — MR AVS SNAPSHOT
[address] 
[phone] Patient: Blair Messina MRN: [id number] :1960 Visit Information Date & Time Provider Department Dept. Phone Encounter #  
 2018  2:00 PM Ann Hampton, [address] [place] [address],[address] [phone] [id number] Upcoming Health Maintenance Date Due Influenza Age 5 to Adult 2018 COLONOSCOPY 2021 Pneumococcal 19-64 Highest Risk (3 of 3 - PPSV23) 2022 DTaP/Tdap/Td series (2 - Td) 2026 Allergies as of 2018  Review Complete On: 2018 By: Victor Manuel Gamez LPN Severity Noted Reaction Type Reactions Sustiva [Efavirenz]  11/15/2011    Vertigo  
 nightmares Current Immunizations  Reviewed on 2017 Name Date Influenza Vaccine 2017, 2016, 2014, 2013 Influenza Vaccine (Quad) PF 2017, 10/22/2015 Influenza Vaccine (Tri) Adjuvanted  Incomplete Influenza Vaccine PF 2013 Meningococcal (MCV4O) Vaccine 2018, 3/20/2018 Pneumococcal Conjugate (PCV-13) 2015 Pneumococcal Conjugate (PCV-7) 2013 Pneumococcal Polysaccharide (PPSV-23) 2017 Tdap 2016 10:35 AM  
  
 Not reviewed this visit You Were Diagnosed With   
  
 Codes Comments Essential hypertension    -  Primary ICD-10-CM: I10 
ICD-9-CM: 401.9 Dyslipidemia     ICD-10-CM: E78.5 ICD-9-CM: 272.4 Prostate cancer screening     ICD-10-CM: Z12.5 ICD-9-CM: V76.44 Encounter for immunization     ICD-10-CM: L83 ICD-9-CM: V03.89 Attention deficit disorder, unspecified hyperactivity presence     ICD-10-CM: F98.8 ICD-9-CM: 314.00 Vitals BP Pulse Temp Height(growth percentile) Weight(growth percentile) SpO2  
 149/80 (BP 1 Location: Left arm, BP Patient Position: Sitting) 64 97.9 °F (36.6 °C) (Oral) 6' 1\" (1.854 m) 242 lb (109.8 kg) 96% BMI Smoking Status 31.93 kg/m2 Former Smoker BMI and BSA Data Body Mass Index Body Surface Area  
 31.93 kg/m 2 2.38 m 2 Preferred Pharmacy [facility] Name Phone [facility] [address] [phone] Your Updated Medication List  
  
   
This list is accurate as of 8/31/18  2:23 PM.  Always use your most recent med list.  
  
  
  
  
 dolutegravir 50 mg Tab tablet Commonly known as:  Lajune Pagan Take 1 Tab by mouth daily. fenofibrate nanocrystallized 145 mg tablet Commonly known as:  TRICOR  
TAKE ONE TABLET BY MOUTH DAILY  
  
 lisinopril 40 mg tablet Commonly known as:  PRINIVIL, ZESTRIL  
TAKE ONE TABLET BY MOUTH DAILY  
  
 methylphenidate HCl 54 mg CR tablet Commonly known as:  CONCERTA Indications: Attention-Deficit Hyperactivity Disorder. Take one tablet daily. May refill [id number]  
  
 metoprolol succinate 100 mg tablet Commonly known as:  TOPROL-XL  
TAKE ONE TABLET BY MOUTH DAILY MULTIVITAMIN PO Take  by mouth. PREZISTA 800 mg tablet Generic drug:  darunavir ethanolate Take 1 Tab by mouth daily (with breakfast). rilpivirine 25 mg Tab tablet Commonly known as:  EDURANT Take 1 Tab by mouth daily (with breakfast). ritonavir 100 mg tablet Commonly known as:  NORVIR  
TAKE ONE TABLET BY MOUTH DAILY WITH BREAKFAST  
  
 sildenafil citrate 100 mg tablet Commonly known as:  VIAGRA Take 1 Tab by mouth as needed. testosterone 20.25 mg/1.25 gram (1.62 %) gel Commonly known as:  ANDROGEL Apply 2 Sprays to affected area daily. Max Daily Amount: 40.5 mg. VITAMIN D3 1,000 unit Cap Generic drug:  cholecalciferol Take  by mouth daily. vitamin E 400 unit capsule Commonly known as:  [address] Take  by mouth daily. Prescriptions Printed  Refills  
 methylphenidate ER 54 mg 24 hr tab 0  
 Sig: Indications: Attention-Deficit Hyperactivity Disorder. Take one tablet daily. May refill [id number] Class: Print We Performed the Following INFLUENZA VACCINE INACTIVATED (IIV), SUBUNIT, ADJUVANTED, IM [id number] CPT(R)] LIPID PANEL [24821 CPT(R)] PSA SCREENING (SCREENING) [ [facility]] Introducing [facility] SERVICES! Dear [address]: Thank you for requesting a [employer] account. Our records indicate that you already have an active [employer] account. You can access your account anytime at [email or web address] Did you know that you can access your hospital and ER discharge instructions at any time in [employer]? You can also review all of your test results from your hospital stay or ER visit. Additional Information If you have questions, please visit the Frequently Asked Questions section of the [employer] website at [email or web address] Remember, TripFab is NOT to be used for urgent needs. For medical emergencies, dial 911. Now available from your iPhone and Android! Please provide this summary of care documentation to your next provider. Your primary care clinician is listed as Adriana ARGUETA. If you have any questions after today's visit, please call [phone].

## 2018-08-31 NOTE — PROGRESS NOTES
Chief Complaint Patient presents with  Physical  
  routine  Weight Gain  
  talk about diet  Medication Refill  
  methylphenidate

## 2018-09-26 ENCOUNTER — PATIENT MESSAGE (OUTPATIENT)
Dept: INTERNAL MEDICINE CLINIC | Age: 58
End: 2018-09-26

## 2018-09-26 DIAGNOSIS — R79.89 LOW TESTOSTERONE: ICD-10-CM

## 2018-09-26 RX ORDER — TESTOSTERONE 16.2 MG/G
GEL TRANSDERMAL
Qty: 1 BOTTLE | Refills: 2 | Status: SHIPPED | OUTPATIENT
Start: 2018-09-26 | End: 2019-05-07

## 2018-09-27 RX ORDER — SILDENAFIL 100 MG/1
100 TABLET, FILM COATED ORAL AS NEEDED
Qty: 6 TAB | Refills: 11 | Status: SHIPPED | OUTPATIENT
Start: 2018-09-27

## 2018-09-27 NOTE — TELEPHONE ENCOUNTER
From: Deyanira Walden  To: Montserrat Umaña MD  Sent: 9/26/2018 6:43 PM EDT  Subject: Prescription Question    I'd like to get my Viagra prescription refilled.  It was 100mg  Thanks

## 2018-10-04 ENCOUNTER — TELEPHONE (OUTPATIENT)
Dept: FAMILY MEDICINE CLINIC | Age: 58
End: 2018-10-04

## 2018-10-04 NOTE — TELEPHONE ENCOUNTER
Pt checking on refill for [facility] he cannot afford to keep going to [facility] and never had a problem w/Dr. Carlos Tejada filling medications     [phone]

## 2018-10-23 RX ORDER — LISINOPRIL 40 MG/1
TABLET ORAL
Qty: 90 TAB | Refills: 2 | Status: SHIPPED | OUTPATIENT
Start: 2018-10-23 | End: 2019-07-18 | Stop reason: SDUPTHER

## 2018-10-23 RX ORDER — ATORVASTATIN CALCIUM 20 MG/1
TABLET, FILM COATED ORAL
Qty: 90 TAB | Refills: 2 | Status: SHIPPED | OUTPATIENT
Start: 2018-10-23 | End: 2019-07-18 | Stop reason: SDUPTHER

## 2018-10-31 RX ORDER — FENOFIBRATE 145 MG/1
TABLET, COATED ORAL
Qty: 90 TAB | Refills: 2 | Status: SHIPPED | OUTPATIENT
Start: 2018-10-31 | End: 2019-07-28 | Stop reason: SDUPTHER

## 2018-11-13 RX ORDER — METOPROLOL SUCCINATE 100 MG/1
TABLET, EXTENDED RELEASE ORAL
Qty: 90 TAB | Refills: 2 | Status: SHIPPED | OUTPATIENT
Start: 2018-11-13 | End: 2019-02-19

## 2019-01-22 ENCOUNTER — DOCUMENTATION ONLY (OUTPATIENT)
Dept: INTERNAL MEDICINE CLINIC | Age: 59
End: 2019-01-22

## 2019-01-22 ENCOUNTER — APPOINTMENT (OUTPATIENT)
Dept: INTERNAL MEDICINE CLINIC | Age: 59
End: 2019-01-22

## 2019-01-23 LAB
ALBUMIN SERPL-MCNC: 4 G/DL (ref 3.5–5.5)
ALBUMIN/GLOB SERPL: 1.1 {RATIO} (ref 1.2–2.2)
ALP SERPL-CCNC: 46 IU/L (ref 39–117)
ALT SERPL-CCNC: 27 IU/L (ref 0–44)
AST SERPL-CCNC: 38 IU/L (ref 0–40)
BILIRUB SERPL-MCNC: 0.4 MG/DL (ref 0–1.2)
BUN SERPL-MCNC: 24 MG/DL (ref 6–24)
BUN/CREAT SERPL: 27 (ref 9–20)
CALCIUM SERPL-MCNC: 9.4 MG/DL (ref 8.7–10.2)
CHLORIDE SERPL-SCNC: 103 MMOL/L (ref 96–106)
CHOLEST SERPL-MCNC: 167 MG/DL (ref 100–199)
CO2 SERPL-SCNC: 23 MMOL/L (ref 20–29)
CREAT SERPL-MCNC: 0.89 MG/DL (ref 0.76–1.27)
GLOBULIN SER CALC-MCNC: 3.5 G/DL (ref 1.5–4.5)
GLUCOSE SERPL-MCNC: 100 MG/DL (ref 65–99)
HDLC SERPL-MCNC: 44 MG/DL
LDLC SERPL CALC-MCNC: 100 MG/DL (ref 0–99)
POTASSIUM SERPL-SCNC: 4 MMOL/L (ref 3.5–5.2)
PROT SERPL-MCNC: 7.5 G/DL (ref 6–8.5)
PSA SERPL-MCNC: 0.6 NG/ML (ref 0–4)
SODIUM SERPL-SCNC: 142 MMOL/L (ref 134–144)
TRIGL SERPL-MCNC: 117 MG/DL (ref 0–149)
TSH SERPL DL<=0.005 MIU/L-ACNC: 1.69 UIU/ML (ref 0.45–4.5)
VLDLC SERPL CALC-MCNC: 23 MG/DL (ref 5–40)

## 2019-02-06 DIAGNOSIS — F98.8 ATTENTION DEFICIT DISORDER, UNSPECIFIED HYPERACTIVITY PRESENCE: ICD-10-CM

## 2019-02-06 RX ORDER — METHYLPHENIDATE HYDROCHLORIDE 54 MG/1
TABLET ORAL
Qty: 30 TAB | Refills: 0 | Status: SHIPPED | OUTPATIENT
Start: 2019-02-06 | End: 2019-02-19 | Stop reason: SDUPTHER

## 2019-02-08 ENCOUNTER — TELEPHONE (OUTPATIENT)
Dept: INTERNAL MEDICINE CLINIC | Age: 59
End: 2019-02-08

## 2019-02-19 ENCOUNTER — OFFICE VISIT (OUTPATIENT)
Dept: INTERNAL MEDICINE CLINIC | Age: 59
End: 2019-02-19

## 2019-02-19 VITALS
OXYGEN SATURATION: 97 % | WEIGHT: 217 LBS | TEMPERATURE: 98.1 F | DIASTOLIC BLOOD PRESSURE: 87 MMHG | HEIGHT: 73 IN | SYSTOLIC BLOOD PRESSURE: 146 MMHG | HEART RATE: 72 BPM | BODY MASS INDEX: 28.76 KG/M2

## 2019-02-19 DIAGNOSIS — I10 ESSENTIAL HYPERTENSION: Primary | ICD-10-CM

## 2019-02-19 DIAGNOSIS — N52.9 ERECTILE DYSFUNCTION, UNSPECIFIED ERECTILE DYSFUNCTION TYPE: ICD-10-CM

## 2019-02-19 DIAGNOSIS — E78.00 PURE HYPERCHOLESTEROLEMIA: ICD-10-CM

## 2019-02-19 DIAGNOSIS — F98.8 ATTENTION DEFICIT DISORDER, UNSPECIFIED HYPERACTIVITY PRESENCE: ICD-10-CM

## 2019-02-19 DIAGNOSIS — R79.89 LOW TESTOSTERONE IN MALE: ICD-10-CM

## 2019-02-19 DIAGNOSIS — F90.8 ATTENTION DEFICIT HYPERACTIVITY DISORDER (ADHD), OTHER TYPE: ICD-10-CM

## 2019-02-19 RX ORDER — METHYLPHENIDATE HYDROCHLORIDE 54 MG/1
TABLET ORAL
Qty: 30 TAB | Refills: 0 | Status: SHIPPED | OUTPATIENT
Start: 2019-02-19 | End: 2019-02-19 | Stop reason: SDUPTHER

## 2019-02-19 RX ORDER — AMLODIPINE BESYLATE 5 MG/1
5 TABLET ORAL DAILY
Qty: 90 TAB | Refills: 3 | Status: SHIPPED | OUTPATIENT
Start: 2019-02-19

## 2019-02-19 RX ORDER — BICTEGRAVIR SODIUM, EMTRICITABINE, AND TENOFOVIR ALAFENAMIDE FUMARATE 50; 200; 25 MG/1; MG/1; MG/1
TABLET ORAL
Refills: 4 | COMMUNITY
Start: 2019-02-08 | End: 2019-11-07 | Stop reason: ALTCHOICE

## 2019-02-19 RX ORDER — METHYLPHENIDATE HYDROCHLORIDE 54 MG/1
TABLET ORAL
Qty: 30 TAB | Refills: 0 | Status: SHIPPED | OUTPATIENT
Start: 2019-02-19 | End: 2019-05-06 | Stop reason: SDUPTHER

## 2019-02-19 NOTE — PROGRESS NOTES
Chief Complaint Patient presents with  Medication Evaluation  
   concerning ED medication  Labs Fasting  Numbness  
  right leg and foot  Headache  
  x 3 weeks off and on

## 2019-02-19 NOTE — PROGRESS NOTES
HISTORY OF PRESENT ILLNESS Sky Ballard is a 62 y.o. male. HPI  
 
F/u HTN dyslipidemia, ADD GERD, low testosterone, obesity Takes ritalin 1/2 tab every day ( gets 30 tabs every 2 months) Last refill 12-16-18 ritalin-- reviewed Had recent labs - glucose 100 psa0.6 tsh wnl Had lost 40 lbs on diet then gained 15 lbs back, will try to lose the 15 lbs again Had detectable VL last year--now seeing ID MD at [facility] Arpita Thrasher med changes per pt Has not taken androgel recently Had ED problems--uses viagra, wondering if metoprolol is causing his ED Last OV In school though MAGED fagan now--CNA Wants to start on a Keto diet -will decrease carbs and sugar in diet per pt Last refill of ritalin on 8-13-18 
  
Last OV Has established with ID at [facility] Dr Margot Laughlin increased to [de-identified], normal CD4, medicines adjusted per ID MD--norvir recommended with prezista 
dexa scan recommended by ID MD 
  
Last refill of ritalin on 3-7-18.  reviewed 
  
 
 
 
Patient Active Problem List  
 Diagnosis Date Noted  LFT elevation 01/30/2015  
 HTN (hypertension) 09/17/2013  Low serum testosterone level 09/17/2013  Dyslipidemia 09/17/2013  Human immunodeficiency virus (HIV) disease ([facility] [address] 01/25/2013  ADD (attention deficit disorder) 01/25/2013 Current Outpatient Medications Medication Sig Dispense Refill  methylphenidate ER 54 mg 24 hr tab Earliest Fill Date: 2/6/19. Take one tablet daily. 30 Tab 0  
 metoprolol succinate (TOPROL-XL) 100 mg tablet TAKE ONE TABLET BY MOUTH DAILY 90 Tab 2  
 fenofibrate nanocrystallized (TRICOR) 145 mg tablet TAKE ONE TABLET BY MOUTH DAILY 90 Tab 2  
 atorvastatin (LIPITOR) 20 mg tablet TAKE ONE TABLET BY MOUTH DAILY 90 Tab 2  
 lisinopril (PRINIVIL, ZESTRIL) 40 mg tablet TAKE ONE TABLET BY MOUTH DAILY 90 Tab 2  
 sildenafil citrate (VIAGRA) 100 mg tablet Take 1 Tab by mouth as needed.  6 Tab 11  
  ANDROGEL 20.25 mg/1.25 gram (1.62 %) gel APPLY 2 PUMP ACTUATIONS (40.5 MG/2.5 GM) TOPICALLY ONCE DAILY IN THE MORNING TO THE SHOULDERS AND UPPER ARMS 1 Bottle 2  
 ritonavir (NORVIR) 100 mg tablet TAKE ONE TABLET BY MOUTH DAILY WITH BREAKFAST 30 Tab 0  
 PREZISTA 800 mg tablet Take 1 Tab by mouth daily (with breakfast). 30 Tab 1  
 dolutegravir (TIVICAY) 50 mg tab tablet Take 1 Tab by mouth daily. 30 Tab 1  rilpivirine (EDURANT) 25 mg tab tablet Take 1 Tab by mouth daily (with breakfast). 90 Tab 4  
 vitamin E (AQUA GEMS) 400 unit capsule Take  by mouth daily.  cholecalciferol (VITAMIN D3) 1,000 unit cap Take  by mouth daily.  MULTIVITAMIN PO Take  by mouth. Allergies Allergen Reactions  Sustiva [Efavirenz] Vertigo  
  nightmares Lab Results Component Value Date/Time WBC 7.0 03/20/2018 11:55 AM  
 HGB 14.0 03/20/2018 11:55 AM  
 HCT 43.1 03/20/2018 11:55 AM  
 PLATELET 343 52/43/4918 11:55 AM  
 MCV 88 03/20/2018 11:55 AM  
 
Lab Results Component Value Date/Time Glucose 100 (H) 01/22/2019 11:34 AM  
 LDL, calculated 100 (H) 01/22/2019 10:18 AM  
 Creatinine 0.89 01/22/2019 11:34 AM  
  
Lab Results Component Value Date/Time Cholesterol, total 167 01/22/2019 10:18 AM  
 HDL Cholesterol 44 01/22/2019 10:18 AM  
 LDL, calculated 100 (H) 01/22/2019 10:18 AM  
 Triglyceride 117 01/22/2019 10:18 AM  
 CHOL/HDL Ratio 6.6 (H) 01/06/2010 11:47 AM  
 
Lab Results Component Value Date/Time GFR est non-AA 94 01/22/2019 11:34 AM  
 GFR est  01/22/2019 11:34 AM  
 Creatinine 0.89 01/22/2019 11:34 AM  
 BUN 24 01/22/2019 11:34 AM  
 Sodium 142 01/22/2019 11:34 AM  
 Potassium 4.0 01/22/2019 11:34 AM  
 Chloride 103 01/22/2019 11:34 AM  
 CO2 23 01/22/2019 11:34 AM  
  
ROS Physical Exam  
Constitutional: He appears well-developed and well-nourished. No distress. Appears stated age HENT:  
Head: Normocephalic. Cardiovascular: Normal rate, regular rhythm and normal heart sounds. Exam reveals no gallop and no friction rub. No murmur heard. Pulmonary/Chest: Effort normal and breath sounds normal. No respiratory distress. He has no wheezes. He has no rales. He exhibits no tenderness. Abdominal: Soft. Musculoskeletal: He exhibits no edema. Neurological: He is alert. Psychiatric: He has a normal mood and affect. Nursing note and vitals reviewed. ASSESSMENT and PLAN Diagnoses and all orders for this visit: 1. Essential hypertension 
 `mild elevation 142/84--change toprol to norvasc d/t to possible sided effects of ED Continue lisinopril 2. Low testosterone in male Pt will restart androgel 3. Pure hypercholesterolemia Controlled-LDL at goal 
4. Attention deficit hyperactivity disorder (ADHD), other type Refill ritalin 2/19 and 4/19 
5. Attention deficit disorder, unspecified hyperactivity presence 
-     methylphenidate ER 54 mg 24 hr tab; Take one tablet daily. 6. Erectile dysfunction, unspecified erectile dysfunction type Change bp medication, viagra prn 7. Overweight Continue diet and exercise Other orders 
-     amLODIPine (NORVASC) 5 mg tablet; Take 1 Tab by mouth daily. Follow-up Disposition: 
Return in about 3 months (around 5/19/2019) for f/u htn ED ADD.

## 2019-05-07 ENCOUNTER — OFFICE VISIT (OUTPATIENT)
Dept: INTERNAL MEDICINE CLINIC | Age: 59
End: 2019-05-07

## 2019-05-07 VITALS
OXYGEN SATURATION: 96 % | WEIGHT: 227 LBS | TEMPERATURE: 97.9 F | DIASTOLIC BLOOD PRESSURE: 83 MMHG | RESPIRATION RATE: 16 BRPM | SYSTOLIC BLOOD PRESSURE: 134 MMHG | BODY MASS INDEX: 30.09 KG/M2 | HEIGHT: 73 IN | HEART RATE: 79 BPM

## 2019-05-07 DIAGNOSIS — R79.89 LOW TESTOSTERONE: Primary | ICD-10-CM

## 2019-05-07 DIAGNOSIS — N52.9 ERECTILE DYSFUNCTION, UNSPECIFIED ERECTILE DYSFUNCTION TYPE: ICD-10-CM

## 2019-05-07 DIAGNOSIS — I10 ESSENTIAL HYPERTENSION: ICD-10-CM

## 2019-05-07 DIAGNOSIS — F98.8 ATTENTION DEFICIT DISORDER, UNSPECIFIED HYPERACTIVITY PRESENCE: ICD-10-CM

## 2019-05-07 RX ORDER — METHYLPHENIDATE HYDROCHLORIDE 54 MG/1
TABLET ORAL
Qty: 30 TAB | Refills: 0 | Status: SHIPPED | OUTPATIENT
Start: 2019-05-07 | End: 2019-08-22 | Stop reason: SDUPTHER

## 2019-05-07 NOTE — PROGRESS NOTES
HISTORY OF PRESENT ILLNESS Ana Maldonado is a 62 y.o. male. HPI  
 
 
F/u HTN ED, ADD, low testosterone, obesity 
toprol was changed to norvasc d/t ED last OV,feels better 
androgel was restarted but pt has not taken it yet and did not feel much better when he took it in the past 
Weight up 10 lbs from last OV--will try to lose a few more lbs Last OV Takes ritalin 1/2 tab every day ( gets 30 tabs every 2 months) Last refill 12-16-18 ritalin-- reviewed Had recent labs - glucose 100 psa0.6 tsh wnl Had lost 40 lbs on diet then gained 15 lbs back, will try to lose the 15 lbs again Had detectable VL last year--now seeing ID MD at [facility] Dr Thao Murguia med changes per pt 
  
Has not taken androgel recently Patient Active Problem List  
 Diagnosis Date Noted  LFT elevation 01/30/2015  
 HTN (hypertension) 09/17/2013  Low serum testosterone level 09/17/2013  Dyslipidemia 09/17/2013  Human immunodeficiency virus (HIV) disease ([facility] [address] 01/25/2013  ADD (attention deficit disorder) 01/25/2013 Current Outpatient Medications Medication Sig Dispense Refill  BIKTARVY -25 mg tab TAKE 1 TABLET BY MOUTH ONCE DAILY  4  
 amLODIPine (NORVASC) 5 mg tablet Take 1 Tab by mouth daily. 90 Tab 3  
 methylphenidate ER 54 mg 24 hr tab Take one tablet daily. 30 Tab 0  
 fenofibrate nanocrystallized (TRICOR) 145 mg tablet TAKE ONE TABLET BY MOUTH DAILY 90 Tab 2  
 atorvastatin (LIPITOR) 20 mg tablet TAKE ONE TABLET BY MOUTH DAILY 90 Tab 2  
 lisinopril (PRINIVIL, ZESTRIL) 40 mg tablet TAKE ONE TABLET BY MOUTH DAILY 90 Tab 2  
 sildenafil citrate (VIAGRA) 100 mg tablet Take 1 Tab by mouth as needed.  6 Tab 11  
 ANDROGEL 20.25 mg/1.25 gram (1.62 %) gel APPLY 2 PUMP ACTUATIONS (40.5 MG/2.5 GM) TOPICALLY ONCE DAILY IN THE MORNING TO THE SHOULDERS AND UPPER ARMS 1 Bottle 2  
 ritonavir (NORVIR) 100 mg tablet TAKE ONE TABLET BY MOUTH DAILY WITH BREAKFAST 30 Tab 0  
  PREZISTA 800 mg tablet Take 1 Tab by mouth daily (with breakfast). 30 Tab 1  
 dolutegravir (TIVICAY) 50 mg tab tablet Take 1 Tab by mouth daily. 30 Tab 1  rilpivirine (EDURANT) 25 mg tab tablet Take 1 Tab by mouth daily (with breakfast). 90 Tab 4  
 vitamin E (AQUA GEMS) 400 unit capsule Take  by mouth daily.  cholecalciferol (VITAMIN D3) 1,000 unit cap Take  by mouth daily.  MULTIVITAMIN PO Take  by mouth. Allergies Allergen Reactions  Sustiva [Efavirenz] Vertigo  
  nightmares Lab Results Component Value Date/Time Glucose 100 (H) 01/22/2019 11:34 AM  
 LDL, calculated 100 (H) 01/22/2019 10:18 AM  
 Creatinine 0.89 01/22/2019 11:34 AM  
  
Lab Results Component Value Date/Time Cholesterol, total 167 01/22/2019 10:18 AM  
 HDL Cholesterol 44 01/22/2019 10:18 AM  
 LDL, calculated 100 (H) 01/22/2019 10:18 AM  
 Triglyceride 117 01/22/2019 10:18 AM  
 CHOL/HDL Ratio 6.6 (H) 01/06/2010 11:47 AM  
 
Lab Results Component Value Date/Time GFR est non-AA 94 01/22/2019 11:34 AM  
 GFR est  01/22/2019 11:34 AM  
 Creatinine 0.89 01/22/2019 11:34 AM  
 BUN 24 01/22/2019 11:34 AM  
 Sodium 142 01/22/2019 11:34 AM  
 Potassium 4.0 01/22/2019 11:34 AM  
 Chloride 103 01/22/2019 11:34 AM  
 CO2 23 01/22/2019 11:34 AM  
  
ROS Physical Exam  
Constitutional: He appears well-developed and well-nourished. No distress. Appears stated age HENT:  
Head: Normocephalic. Cardiovascular: Normal rate, regular rhythm and normal heart sounds. Exam reveals no gallop and no friction rub. No murmur heard. Pulmonary/Chest: Effort normal and breath sounds normal. No respiratory distress. He has no wheezes. He has no rales. He exhibits no tenderness. Abdominal: Soft. Musculoskeletal: He exhibits no edema. Neurological: He is alert. Psychiatric: He has a normal mood and affect. Nursing note and vitals reviewed.  
 
 
ASSESSMENT and PLAN 
 Diagnoses and all orders for this visit: 1. Low testosterone -     Declines angorgel or replacement 2. Attention deficit disorder, unspecified hyperactivity presence 
-     methylphenidate ER 54 mg 24 hr tab; Take one tablet daily. Indications: Attention Deficit Disorder with Hyperactivity Refill 6-19-19 3. Essential hypertension 
-     CBC W/O DIFF 
-     METABOLIC PANEL, COMPREHENSIVE Controlled on current medicines 4. Erectile dysfunction, unspecified erectile dysfunction type Better on norvasc, off atenolol Follow-up and Dispositions · Return in about 6 months (around 11/7/2019) for add htn ED HLD.

## 2019-07-18 ENCOUNTER — TELEPHONE (OUTPATIENT)
Dept: INTERNAL MEDICINE CLINIC | Age: 59
End: 2019-07-18

## 2019-07-18 RX ORDER — LISINOPRIL 40 MG/1
TABLET ORAL
Qty: 90 TAB | Refills: 1 | Status: SHIPPED | OUTPATIENT
Start: 2019-07-18

## 2019-07-18 RX ORDER — ATORVASTATIN CALCIUM 20 MG/1
TABLET, FILM COATED ORAL
Qty: 90 TAB | Refills: 1 | Status: SHIPPED | OUTPATIENT
Start: 2019-07-18

## 2019-07-18 NOTE — TELEPHONE ENCOUNTER
Patient states he sent \"MyChart\" message in reference to his refills. Patient states to ignore message. His requests has been resolved already & no response required.  Thank you

## 2019-07-19 ENCOUNTER — TELEPHONE (OUTPATIENT)
Dept: INTERNAL MEDICINE CLINIC | Age: 59
End: 2019-07-19

## 2019-07-19 DIAGNOSIS — R31.9 HEMATURIA, UNSPECIFIED TYPE: Primary | ICD-10-CM

## 2019-07-19 NOTE — TELEPHONE ENCOUNTER
Called, spoke to pt. Two identifiers confirmed. Pt sent a [employer] message this morning regarding blood in his urine. Pt stated he has previously had this problem d/t kidney stones. Pt stated his urine is getting darker much like what happened when he had kidney stones. Notified pt Dr. Tanja Chen has ordered some labs for pt's urine to be checked. Pt stated he will be in on Monday (7/22) to drop off urine sample. Pt verbalized understanding of information discussed w/ no further questions at this time.

## 2019-07-25 ENCOUNTER — CLINICAL SUPPORT (OUTPATIENT)
Dept: INTERNAL MEDICINE CLINIC | Age: 59
End: 2019-07-25

## 2019-07-25 DIAGNOSIS — R31.9 HEMATURIA, UNSPECIFIED TYPE: Primary | ICD-10-CM

## 2019-07-25 LAB
BILIRUB UR QL STRIP: NEGATIVE
GLUCOSE UR-MCNC: NEGATIVE MG/DL
KETONES P FAST UR STRIP-MCNC: NEGATIVE MG/DL
PH UR STRIP: 5 [PH] (ref 4.6–8)
PROT UR QL STRIP: NEGATIVE
SP GR UR STRIP: 1.02 (ref 1–1.03)
UA UROBILINOGEN AMB POC: NORMAL (ref 0.2–1)
URINALYSIS CLARITY POC: CLEAR
URINALYSIS COLOR POC: YELLOW
URINE BLOOD POC: NORMAL
URINE LEUKOCYTES POC: NORMAL
URINE NITRITES POC: NEGATIVE

## 2019-07-25 NOTE — PROGRESS NOTES
Pt here due to blood found in urine. Pt thinks he is dehydrated, has been working in the hear a lot. Urine sent to lab for culture and UA. Pt will be called with any findings.

## 2019-07-27 LAB
APPEARANCE UR: ABNORMAL
BACTERIA #/AREA URNS HPF: ABNORMAL /[HPF]
BACTERIA UR CULT: NO GROWTH
BILIRUB UR QL STRIP: NEGATIVE
CASTS URNS QL MICRO: ABNORMAL /LPF
COLOR UR: YELLOW
CRYSTALS URNS MICRO: ABNORMAL
EPI CELLS #/AREA URNS HPF: ABNORMAL /HPF (ref 0–10)
GLUCOSE UR QL: NEGATIVE
HGB UR QL STRIP: ABNORMAL
KETONES UR QL STRIP: NEGATIVE
LEUKOCYTE ESTERASE UR QL STRIP: ABNORMAL
MICRO URNS: ABNORMAL
MUCOUS THREADS URNS QL MICRO: PRESENT
NITRITE UR QL STRIP: NEGATIVE
PH UR STRIP: 5.5 [PH] (ref 5–7.5)
PROT UR QL STRIP: NEGATIVE
RBC #/AREA URNS HPF: ABNORMAL /HPF (ref 0–2)
SP GR UR: 1.02 (ref 1–1.03)
UNIDENT CRYS URNS QL MICRO: PRESENT
URINALYSIS REFLEX, 377202: ABNORMAL
UROBILINOGEN UR STRIP-MCNC: 1 MG/DL (ref 0.2–1)
WBC #/AREA URNS HPF: ABNORMAL /HPF (ref 0–5)

## 2019-07-28 RX ORDER — FENOFIBRATE 145 MG/1
TABLET, COATED ORAL
Qty: 90 TAB | Refills: 1 | Status: SHIPPED | OUTPATIENT
Start: 2019-07-28

## 2019-08-22 DIAGNOSIS — F98.8 ATTENTION DEFICIT DISORDER, UNSPECIFIED HYPERACTIVITY PRESENCE: ICD-10-CM

## 2019-08-22 RX ORDER — METHYLPHENIDATE HYDROCHLORIDE 54 MG/1
TABLET ORAL
Qty: 30 TAB | Refills: 0 | Status: SHIPPED | OUTPATIENT
Start: 2019-08-22 | End: 2019-10-29 | Stop reason: SDUPTHER

## 2019-10-03 ENCOUNTER — TELEPHONE (OUTPATIENT)
Dept: INTERNAL MEDICINE CLINIC | Age: 59
End: 2019-10-03

## 2019-10-03 ENCOUNTER — OFFICE VISIT (OUTPATIENT)
Dept: INTERNAL MEDICINE CLINIC | Age: 59
End: 2019-10-03

## 2019-10-03 VITALS
SYSTOLIC BLOOD PRESSURE: 125 MMHG | WEIGHT: 233 LBS | OXYGEN SATURATION: 97 % | BODY MASS INDEX: 30.88 KG/M2 | HEART RATE: 85 BPM | HEIGHT: 73 IN | RESPIRATION RATE: 16 BRPM | DIASTOLIC BLOOD PRESSURE: 78 MMHG | TEMPERATURE: 98.2 F

## 2019-10-03 DIAGNOSIS — J40 BRONCHITIS: Primary | ICD-10-CM

## 2019-10-03 RX ORDER — AZITHROMYCIN 250 MG/1
250 TABLET, FILM COATED ORAL SEE ADMIN INSTRUCTIONS
Qty: 6 TAB | Refills: 0 | Status: SHIPPED | OUTPATIENT
Start: 2019-10-03 | End: 2019-10-08

## 2019-10-03 NOTE — PROGRESS NOTES
HISTORY OF PRESENT ILLNESS  Esperanza Gonzalez is a 62 y.o. male. HPI   C/o cough and sore throat x 1 week  Cough was productive of brown phlegm, clearing up a bit but still coughs very frequently  No f/c sob  sorethroat is improving some      Patient Active Problem List    Diagnosis Date Noted    LFT elevation 01/30/2015    HTN (hypertension) 09/17/2013    Low serum testosterone level 09/17/2013    Dyslipidemia 09/17/2013    Human immunodeficiency virus ([facility]) disease ([facility] 01/25/2013    ADD (attention deficit disorder) 01/25/2013     Current Outpatient Medications   Medication Sig Dispense Refill    methylphenidate ER 54 mg 24 hr tab Take one tablet daily. Indications: Attention Deficit Disorder with Hyperactivity 30 Tab 0    fenofibrate nanocrystallized (TRICOR) 145 mg tablet TAKE ONE TABLET BY MOUTH DAILY 90 Tab 1    lisinopril (PRINIVIL, ZESTRIL) 40 mg tablet TAKE ONE TABLET BY MOUTH DAILY 90 Tab 1    atorvastatin (LIPITOR) 20 mg tablet TAKE ONE TABLET BY MOUTH DAILY 90 Tab 1    BIKTARVY -25 mg tab TAKE 1 TABLET BY MOUTH ONCE DAILY  4    amLODIPine (NORVASC) 5 mg tablet Take 1 Tab by mouth daily. 90 Tab 3    sildenafil citrate (VIAGRA) 100 mg tablet Take 1 Tab by mouth as needed. 6 Tab 11    dolutegravir (TIVICAY) 50 mg tab tablet Take 1 Tab by mouth daily. 30 Tab 1    rilpivirine (EDURANT) 25 mg tab tablet Take 1 Tab by mouth daily (with breakfast). 90 Tab 4    vitamin E (AQUA GEMS) 400 unit capsule Take  by mouth daily.  cholecalciferol (VITAMIN D3) 1,000 unit cap Take  by mouth daily.  MULTIVITAMIN PO Take  by mouth.          Allergies   Allergen Reactions    Sustiva [Efavirenz] Vertigo     nightmares      Lab Results   Component Value Date/Time    GFR est non-AA 94 01/22/2019 11:34 AM    GFR est  01/22/2019 11:34 AM    Creatinine 0.89 01/22/2019 11:34 AM    BUN 24 01/22/2019 11:34 AM    Sodium 142 01/22/2019 11:34 AM    Potassium 4.0 01/22/2019 11:34 AM    Chloride 103 01/22/2019 11:34 AM    CO2 23 01/22/2019 11:34 AM        ROS    Physical Exam   Constitutional: He appears well-developed and well-nourished. No distress. Appears stated age   HENT:   Head: Normocephalic. Mouth/Throat: Oropharynx is clear and moist.   Cardiovascular: Normal rate, regular rhythm and normal heart sounds. Exam reveals no gallop and no friction rub. Pulmonary/Chest: Effort normal and breath sounds normal. No respiratory distress. He has no wheezes. He has no rales. He exhibits no tenderness. Abdominal: Soft. Musculoskeletal: He exhibits no edema. Lymphadenopathy:     He has no cervical adenopathy. Neurological: He is alert. Psychiatric: He has a normal mood and affect. Nursing note and vitals reviewed. ASSESSMENT and PLAN  1.  Bronchitis   zpak    otc cough medication

## 2019-10-03 NOTE — TELEPHONE ENCOUNTER
#[phone] pt states he has been sick with cold symptoms and a very sore throat for some time. Pt would like to be seen today. Please call pt to advise if he can get in or does he need to go to urgent care of some type?

## 2019-10-03 NOTE — PATIENT INSTRUCTIONS
Office Policies    Phone calls/patient messages:            Please allow up to 24 hours for someone in the office to contact you about your call or message. Be mindful your provider may be out of the office or your message may require further review. We encourage you to use [employer] for your messages as this is a faster, more efficient way to communicate with our office                         Medication Refills:            Prescription medications require 48-72 business hours to process. We encourage you to use [employer] for your refills. For controlled medications: Please allow 72 business hours to process. Certain medications may require you to  a written prescription at our office. NO narcotic/controlled medications will be prescribed after 4pm Monday through Friday or on weekends              Form/Paperwork Completion:            Please note a $25 fee may incur for all paperwork for completed by our providers. We ask that you allow 7-10 business days. Pre-payment is due prior to picking up/faxing the completed form. You may also download your forms to [employer] to have your doctor print off.

## 2019-10-03 NOTE — PROGRESS NOTES
Chief Complaint   Patient presents with    Cough     productive    Nasal Congestion     Slight sorethroat x 1 week    Mass     throat area

## 2019-10-03 NOTE — TELEPHONE ENCOUNTER
Called, spoke to pt. Two identifiers confirmed. Appointment scheduled for today @ 330 with Dr. Vicenta Long. Pt verbalized understanding of information discussed w/ no further questions at this time.

## 2019-10-29 DIAGNOSIS — F98.8 ATTENTION DEFICIT DISORDER, UNSPECIFIED HYPERACTIVITY PRESENCE: ICD-10-CM

## 2019-10-29 RX ORDER — METHYLPHENIDATE HYDROCHLORIDE 54 MG/1
TABLET ORAL
Qty: 30 TAB | Refills: 0 | Status: SHIPPED | OUTPATIENT
Start: 2019-10-29

## 2019-11-06 RX ORDER — METHYLPHENIDATE HYDROCHLORIDE 54 MG/1
TABLET ORAL
Qty: 30 TAB | Refills: 0 | Status: CANCELLED | OUTPATIENT
Start: 2019-11-06

## 2019-11-07 ENCOUNTER — OFFICE VISIT (OUTPATIENT)
Dept: INTERNAL MEDICINE CLINIC | Age: 59
End: 2019-11-07

## 2019-11-07 ENCOUNTER — TELEPHONE (OUTPATIENT)
Dept: INTERNAL MEDICINE CLINIC | Age: 59
End: 2019-11-07

## 2019-11-07 VITALS
BODY MASS INDEX: 31.01 KG/M2 | SYSTOLIC BLOOD PRESSURE: 120 MMHG | DIASTOLIC BLOOD PRESSURE: 80 MMHG | RESPIRATION RATE: 16 BRPM | HEART RATE: 84 BPM | OXYGEN SATURATION: 98 % | HEIGHT: 73 IN | WEIGHT: 234 LBS | TEMPERATURE: 97.9 F

## 2019-11-07 DIAGNOSIS — Z00.00 ROUTINE GENERAL MEDICAL EXAMINATION AT A HEALTH CARE FACILITY: Primary | ICD-10-CM

## 2019-11-07 DIAGNOSIS — E78.00 PURE HYPERCHOLESTEROLEMIA: ICD-10-CM

## 2019-11-07 DIAGNOSIS — I10 ESSENTIAL HYPERTENSION: ICD-10-CM

## 2019-11-07 DIAGNOSIS — J06.9 VIRAL UPPER RESPIRATORY TRACT INFECTION: Primary | ICD-10-CM

## 2019-11-07 DIAGNOSIS — F98.8 ATTENTION DEFICIT DISORDER, UNSPECIFIED HYPERACTIVITY PRESENCE: ICD-10-CM

## 2019-11-07 NOTE — PROGRESS NOTES
Chief Complaint   Patient presents with    Hypertension     6 month follow up    Cholesterol Problem     6 month follow up    Labs     6 month follow up (Fasting)    Sore Throat     x 3 days

## 2019-11-07 NOTE — PATIENT INSTRUCTIONS
Office Policies    Phone calls/patient messages:            Please allow up to 24 hours for someone in the office to contact you about your call or message. Be mindful your provider may be out of the office or your message may require further review. We encourage you to use [employer] for your messages as this is a faster, more efficient way to communicate with our office                         Medication Refills:            Prescription medications require 48-72 business hours to process. We encourage you to use [employer] for your refills. For controlled medications: Please allow 72 business hours to process. Certain medications may require you to  a written prescription at our office. NO narcotic/controlled medications will be prescribed after 4pm Monday through Friday or on weekends              Form/Paperwork Completion:            Please note a $25 fee may incur for all paperwork for completed by our providers. We ask that you allow 7-10 business days. Pre-payment is due prior to picking up/faxing the completed form. You may also download your forms to [employer] to have your doctor print off.

## 2019-11-07 NOTE — PROGRESS NOTES
HISTORY OF PRESENT ILLNESS  Gissel Coto is a 62 y.o. male. 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 Medication remains helpful for ADD    Last OV  toprol was changed to norvasc d/t ED last OV,feels better  androgel was restarted but pt has not taken it yet and did not feel much better when he took it in the past  Weight up 10 lbs from last OV--will try to lose a few more lbs     Last OV  Takes ritalin 1/2 tab every day ( gets 30 tabs every 2 months)  Last refill 12-16-18 ritalin-- reviewed  Had recent labs - glucose 100 psa0.6 tsh wnl  Had lost 40 lbs on diet then gained 15 lbs back, will try to lose the 15 lbs again  Had detectable VL last year--now seeing ID MD at [facility] Dr Neymar Guillermo med changes per pt     Has not taken androgel recently    Patient Active Problem List    Diagnosis Date Noted    LFT elevation 01/30/2015    HTN (hypertension) 09/17/2013    Low serum testosterone level 09/17/2013    Dyslipidemia 09/17/2013    Human immunodeficiency virus (HIV) disease ([facility] [address] 01/25/2013    ADD (attention deficit disorder) 01/25/2013     Current Outpatient Medications   Medication Sig Dispense Refill    methylphenidate ER 54 mg 24 hr tab Take one tablet daily. Indications: Attention Deficit Disorder with Hyperactivity 30 Tab 0    fenofibrate nanocrystallized (TRICOR) 145 mg tablet TAKE ONE TABLET BY MOUTH DAILY 90 Tab 1    lisinopril (PRINIVIL, ZESTRIL) 40 mg tablet TAKE ONE TABLET BY MOUTH DAILY 90 Tab 1    atorvastatin (LIPITOR) 20 mg tablet TAKE ONE TABLET BY MOUTH DAILY 90 Tab 1    amLODIPine (NORVASC) 5 mg tablet Take 1 Tab by mouth daily.  90 Tab 3    sildenafil citrate (VIAGRA) 100 mg tablet Take 1 Tab by mouth as needed. 6 Tab 11    dolutegravir (TIVICAY) 50 mg tab tablet Take 1 Tab by mouth daily. 30 Tab 1    rilpivirine (EDURANT) 25 mg tab tablet Take 1 Tab by mouth daily (with breakfast). 90 Tab 4    vitamin E (AQUA GEMS) 400 unit capsule Take  by mouth daily.  cholecalciferol (VITAMIN D3) 1,000 unit cap Take  by mouth daily.  MULTIVITAMIN PO Take  by mouth. Allergies   Allergen Reactions    Sustiva [Efavirenz] Vertigo     nightmares      Lab Results   Component Value Date/Time    WBC 7.0 03/20/2018 11:55 AM    HGB 14.0 03/20/2018 11:55 AM    HCT 43.1 03/20/2018 11:55 AM    PLATELET 884 15/75/8712 11:55 AM    MCV 88 03/20/2018 11:55 AM     Lab Results   Component Value Date/Time    Glucose 100 (H) 01/22/2019 11:34 AM    LDL, calculated 100 (H) 01/22/2019 10:18 AM    Creatinine 0.89 01/22/2019 11:34 AM      Lab Results   Component Value Date/Time    Cholesterol, total 167 01/22/2019 10:18 AM    HDL Cholesterol 44 01/22/2019 10:18 AM    LDL, calculated 100 (H) 01/22/2019 10:18 AM    Triglyceride 117 01/22/2019 10:18 AM    CHOL/HDL Ratio 6.6 (H) 01/06/2010 11:47 AM     Lab Results   Component Value Date/Time    GFR est non-AA 94 01/22/2019 11:34 AM    GFR est  01/22/2019 11:34 AM    Creatinine 0.89 01/22/2019 11:34 AM    BUN 24 01/22/2019 11:34 AM    Sodium 142 01/22/2019 11:34 AM    Potassium 4.0 01/22/2019 11:34 AM    Chloride 103 01/22/2019 11:34 AM    CO2 23 01/22/2019 11:34 AM        ROS    Physical Exam   Constitutional: He appears well-developed and well-nourished. No distress. Appears stated age   HENT:   Head: Normocephalic. Mouth/Throat: Oropharynx is clear and moist.   Cardiovascular: Normal rate, regular rhythm and normal heart sounds. Exam reveals no gallop and no friction rub. No murmur heard. Pulmonary/Chest: Effort normal and breath sounds normal. No respiratory distress. He has no wheezes. He has no rales.  He exhibits no tenderness. Abdominal: Soft. He exhibits no distension and no mass. There is no tenderness. There is no rebound and no guarding. Musculoskeletal: He exhibits no edema. Neurological: He is alert. Psychiatric: He has a normal mood and affect. Nursing note and vitals reviewed. ASSESSMENT and PLAN  Diagnoses and all orders for this visit:    1. Viral upper respiratory tract infection   Cepacol, gargles discussed  2. Attention deficit disorder, unspecified hyperactivity presence   Will efill ritalin again in 2 months-controlled  3. Essential hypertension   Good control  4. Pure hypercholesterolemia   Continue statin-LDL at goal    Follow-up and Dispositions    · Return in about 6 months (around 5/7/2020) for CPE.

## 2022-10-30 NOTE — TELEPHONE ENCOUNTER
Patient states this refill was requested in Error. Please disregard.  Thank you ataxic/impaired balance/cognition/impaired coordination/impaired postural control/decreased strength